# Patient Record
Sex: MALE | Race: WHITE | NOT HISPANIC OR LATINO | URBAN - METROPOLITAN AREA
[De-identification: names, ages, dates, MRNs, and addresses within clinical notes are randomized per-mention and may not be internally consistent; named-entity substitution may affect disease eponyms.]

---

## 2019-06-28 ENCOUNTER — INPATIENT (INPATIENT)
Facility: HOSPITAL | Age: 44
LOS: 2 days | Discharge: HOME | DRG: 175 | End: 2019-07-01
Attending: INTERNAL MEDICINE | Admitting: INTERNAL MEDICINE
Payer: COMMERCIAL

## 2019-06-28 VITALS
RESPIRATION RATE: 18 BRPM | TEMPERATURE: 98 F | HEART RATE: 123 BPM | WEIGHT: 175.05 LBS | DIASTOLIC BLOOD PRESSURE: 76 MMHG | HEIGHT: 71 IN | OXYGEN SATURATION: 100 % | SYSTOLIC BLOOD PRESSURE: 131 MMHG

## 2019-06-28 DIAGNOSIS — I26.99 OTHER PULMONARY EMBOLISM WITHOUT ACUTE COR PULMONALE: ICD-10-CM

## 2019-06-28 DIAGNOSIS — R09.89 OTHER SPECIFIED SYMPTOMS AND SIGNS INVOLVING THE CIRCULATORY AND RESPIRATORY SYSTEMS: ICD-10-CM

## 2019-06-28 DIAGNOSIS — J96.01 ACUTE RESPIRATORY FAILURE WITH HYPOXIA: ICD-10-CM

## 2019-06-28 DIAGNOSIS — R91.8 OTHER NONSPECIFIC ABNORMAL FINDING OF LUNG FIELD: ICD-10-CM

## 2019-06-28 DIAGNOSIS — D72.829 ELEVATED WHITE BLOOD CELL COUNT, UNSPECIFIED: ICD-10-CM

## 2019-06-28 DIAGNOSIS — Z91.89 OTHER SPECIFIED PERSONAL RISK FACTORS, NOT ELSEWHERE CLASSIFIED: ICD-10-CM

## 2019-06-28 DIAGNOSIS — R63.8 OTHER SYMPTOMS AND SIGNS CONCERNING FOOD AND FLUID INTAKE: ICD-10-CM

## 2019-06-28 DIAGNOSIS — Z86.718 PERSONAL HISTORY OF OTHER VENOUS THROMBOSIS AND EMBOLISM: ICD-10-CM

## 2019-06-28 DIAGNOSIS — I10 ESSENTIAL (PRIMARY) HYPERTENSION: ICD-10-CM

## 2019-06-28 LAB
ALBUMIN SERPL ELPH-MCNC: 4.4 G/DL — SIGNIFICANT CHANGE UP (ref 3.3–5)
ALP SERPL-CCNC: 86 U/L — SIGNIFICANT CHANGE UP (ref 40–120)
ALT FLD-CCNC: 42 U/L — SIGNIFICANT CHANGE UP (ref 10–45)
ANION GAP SERPL CALC-SCNC: 14 MMOL/L — SIGNIFICANT CHANGE UP (ref 5–17)
APTT BLD: 30 SEC — SIGNIFICANT CHANGE UP (ref 27.5–36.3)
APTT BLD: 33.7 SEC — SIGNIFICANT CHANGE UP (ref 27.5–36.3)
AST SERPL-CCNC: 30 U/L — SIGNIFICANT CHANGE UP (ref 10–40)
BASOPHILS # BLD AUTO: 0.04 K/UL — SIGNIFICANT CHANGE UP (ref 0–0.2)
BASOPHILS NFR BLD AUTO: 0.3 % — SIGNIFICANT CHANGE UP (ref 0–2)
BILIRUB SERPL-MCNC: 0.4 MG/DL — SIGNIFICANT CHANGE UP (ref 0.2–1.2)
BUN SERPL-MCNC: 17 MG/DL — SIGNIFICANT CHANGE UP (ref 7–23)
CALCIUM SERPL-MCNC: 8.8 MG/DL — SIGNIFICANT CHANGE UP (ref 8.4–10.5)
CHLORIDE SERPL-SCNC: 104 MMOL/L — SIGNIFICANT CHANGE UP (ref 96–108)
CO2 SERPL-SCNC: 23 MMOL/L — SIGNIFICANT CHANGE UP (ref 22–31)
CREAT SERPL-MCNC: 0.99 MG/DL — SIGNIFICANT CHANGE UP (ref 0.5–1.3)
EOSINOPHIL # BLD AUTO: 0.02 K/UL — SIGNIFICANT CHANGE UP (ref 0–0.5)
EOSINOPHIL NFR BLD AUTO: 0.2 % — SIGNIFICANT CHANGE UP (ref 0–6)
GAS PNL BLDV: SIGNIFICANT CHANGE UP
GLUCOSE SERPL-MCNC: 103 MG/DL — HIGH (ref 70–99)
HCT VFR BLD CALC: 43.7 % — SIGNIFICANT CHANGE UP (ref 39–50)
HGB BLD-MCNC: 14.9 G/DL — SIGNIFICANT CHANGE UP (ref 13–17)
HIV 1+2 AB+HIV1 P24 AG SERPL QL IA: SIGNIFICANT CHANGE UP
IMM GRANULOCYTES NFR BLD AUTO: 0.6 % — SIGNIFICANT CHANGE UP (ref 0–1.5)
INR BLD: 1.13 — SIGNIFICANT CHANGE UP (ref 0.88–1.16)
LACTATE SERPL-SCNC: 2 MMOL/L — SIGNIFICANT CHANGE UP (ref 0.5–2)
LIDOCAIN IGE QN: 18 U/L — SIGNIFICANT CHANGE UP (ref 7–60)
LYMPHOCYTES # BLD AUTO: 0.84 K/UL — LOW (ref 1–3.3)
LYMPHOCYTES # BLD AUTO: 6.9 % — LOW (ref 13–44)
MCHC RBC-ENTMCNC: 31.6 PG — SIGNIFICANT CHANGE UP (ref 27–34)
MCHC RBC-ENTMCNC: 34.1 GM/DL — SIGNIFICANT CHANGE UP (ref 32–36)
MCV RBC AUTO: 92.6 FL — SIGNIFICANT CHANGE UP (ref 80–100)
MONOCYTES # BLD AUTO: 1.08 K/UL — HIGH (ref 0–0.9)
MONOCYTES NFR BLD AUTO: 8.9 % — SIGNIFICANT CHANGE UP (ref 2–14)
NEUTROPHILS # BLD AUTO: 10.06 K/UL — HIGH (ref 1.8–7.4)
NEUTROPHILS NFR BLD AUTO: 83.1 % — HIGH (ref 43–77)
NRBC # BLD: 0 /100 WBCS — SIGNIFICANT CHANGE UP (ref 0–0)
NT-PROBNP SERPL-SCNC: 384 PG/ML — HIGH (ref 0–300)
PLATELET # BLD AUTO: 179 K/UL — SIGNIFICANT CHANGE UP (ref 150–400)
POTASSIUM SERPL-MCNC: 3.8 MMOL/L — SIGNIFICANT CHANGE UP (ref 3.5–5.3)
POTASSIUM SERPL-SCNC: 3.8 MMOL/L — SIGNIFICANT CHANGE UP (ref 3.5–5.3)
PROT SERPL-MCNC: 7.4 G/DL — SIGNIFICANT CHANGE UP (ref 6–8.3)
PROTHROM AB SERPL-ACNC: 12.8 SEC — SIGNIFICANT CHANGE UP (ref 10–12.9)
RBC # BLD: 4.72 M/UL — SIGNIFICANT CHANGE UP (ref 4.2–5.8)
RBC # FLD: 12.4 % — SIGNIFICANT CHANGE UP (ref 10.3–14.5)
SODIUM SERPL-SCNC: 141 MMOL/L — SIGNIFICANT CHANGE UP (ref 135–145)
TROPONIN T SERPL-MCNC: 0.12 NG/ML — CRITICAL HIGH (ref 0–0.01)
TROPONIN T SERPL-MCNC: 0.17 NG/ML — CRITICAL HIGH (ref 0–0.01)
WBC # BLD: 12.11 K/UL — HIGH (ref 3.8–10.5)
WBC # FLD AUTO: 12.11 K/UL — HIGH (ref 3.8–10.5)

## 2019-06-28 PROCEDURE — 99223 1ST HOSP IP/OBS HIGH 75: CPT | Mod: GC

## 2019-06-28 PROCEDURE — 71275 CT ANGIOGRAPHY CHEST: CPT | Mod: 26

## 2019-06-28 PROCEDURE — 99291 CRITICAL CARE FIRST HOUR: CPT

## 2019-06-28 PROCEDURE — 99292 CRITICAL CARE ADDL 30 MIN: CPT

## 2019-06-28 PROCEDURE — 71045 X-RAY EXAM CHEST 1 VIEW: CPT | Mod: 26

## 2019-06-28 PROCEDURE — 99222 1ST HOSP IP/OBS MODERATE 55: CPT | Mod: GC

## 2019-06-28 RX ORDER — DIPHENHYDRAMINE HCL 50 MG
50 CAPSULE ORAL ONCE
Refills: 0 | Status: COMPLETED | OUTPATIENT
Start: 2019-06-28 | End: 2019-06-28

## 2019-06-28 RX ORDER — ALBUTEROL 90 UG/1
2.5 AEROSOL, METERED ORAL ONCE
Refills: 0 | Status: COMPLETED | OUTPATIENT
Start: 2019-06-28 | End: 2019-06-28

## 2019-06-28 RX ORDER — FAMOTIDINE 10 MG/ML
20 INJECTION INTRAVENOUS ONCE
Refills: 0 | Status: DISCONTINUED | OUTPATIENT
Start: 2019-06-28 | End: 2019-06-28

## 2019-06-28 RX ORDER — HEPARIN SODIUM 5000 [USP'U]/ML
1400 INJECTION INTRAVENOUS; SUBCUTANEOUS
Qty: 25000 | Refills: 0 | Status: DISCONTINUED | OUTPATIENT
Start: 2019-06-28 | End: 2019-06-28

## 2019-06-28 RX ORDER — ENOXAPARIN SODIUM 100 MG/ML
80 INJECTION SUBCUTANEOUS ONCE
Refills: 0 | Status: COMPLETED | OUTPATIENT
Start: 2019-06-28 | End: 2019-06-28

## 2019-06-28 RX ORDER — SODIUM CHLORIDE 9 MG/ML
1000 INJECTION INTRAMUSCULAR; INTRAVENOUS; SUBCUTANEOUS ONCE
Refills: 0 | Status: COMPLETED | OUTPATIENT
Start: 2019-06-28 | End: 2019-06-28

## 2019-06-28 RX ORDER — HEPARIN SODIUM 5000 [USP'U]/ML
1400 INJECTION INTRAVENOUS; SUBCUTANEOUS
Qty: 25000 | Refills: 0 | Status: DISCONTINUED | OUTPATIENT
Start: 2019-06-28 | End: 2019-06-29

## 2019-06-28 RX ADMIN — ALBUTEROL 2.5 MILLIGRAM(S): 90 AEROSOL, METERED ORAL at 09:30

## 2019-06-28 RX ADMIN — SODIUM CHLORIDE 1000 MILLILITER(S): 9 INJECTION INTRAMUSCULAR; INTRAVENOUS; SUBCUTANEOUS at 11:00

## 2019-06-28 RX ADMIN — ENOXAPARIN SODIUM 80 MILLIGRAM(S): 100 INJECTION SUBCUTANEOUS at 12:36

## 2019-06-28 RX ADMIN — SODIUM CHLORIDE 1000 MILLILITER(S): 9 INJECTION INTRAMUSCULAR; INTRAVENOUS; SUBCUTANEOUS at 09:31

## 2019-06-28 NOTE — H&P ADULT - PROBLEM SELECTOR PLAN 6
1) PCP Contacted on Admission: (Y/N) --> Name & Phone #:  2) Date of Contact with PCP: TBD  3) PCP Contacted at Discharge: TBD  4) Summary of Handoff Given to PCP: TBD   5) Post-Discharge Appointment Date: TBD 1) PCP Contacted on Admission: (Y/N) --> Name & Phone #: Dr. Ramsey Jovel  2) Date of Contact with PCP: TBD  3) PCP Contacted at Discharge: TBD  4) Summary of Handoff Given to PCP: TBD   5) Post-Discharge Appointment Date: TBD

## 2019-06-28 NOTE — ED PROVIDER NOTE - CRITICAL CARE PROVIDED
direct patient care (not related to procedure)/additional history taking/consultation with other physicians/interpretation of diagnostic studies/documentation/consult w/ pt's family directly relating to pts condition

## 2019-06-28 NOTE — H&P ADULT - NSHPLABSRESULTS_GEN_ALL_CORE
.  LABS:                         14.9   12.11 )-----------( 179      ( 28 Jun 2019 09:20 )             43.7     06-28    141  |  104  |  17  ----------------------------<  103<H>  3.8   |  23  |  0.99    Ca    8.8      28 Jun 2019 09:20    TPro  7.4  /  Alb  4.4  /  TBili  0.4  /  DBili  x   /  AST  30  /  ALT  42  /  AlkPhos  86  06-28    PT/INR - ( 28 Jun 2019 09:20 )   PT: 12.8 sec;   INR: 1.13          PTT - ( 28 Jun 2019 09:20 )  PTT:30.0 sec    CARDIAC MARKERS ( 28 Jun 2019 10:49 )  x     / 0.17 ng/mL / x     / x     / x          Serum Pro-Brain Natriuretic Peptide: 384 pg/mL (06-28 @ 10:49)    Lactate, Blood: 2.0 mmoL/L (06-28 @ 09:27)      RADIOLOGY, EKG & ADDITIONAL TESTS: Reviewed.

## 2019-06-28 NOTE — ED ADULT NURSE REASSESSMENT NOTE - NS ED NURSE REASSESS COMMENT FT1
pt updated on plan of care, pending admission, to started heparin at 12am due to Lovenox being given as per pharmacist.

## 2019-06-28 NOTE — CONSULT NOTE ADULT - ASSESSMENT
Pt is a 44yo male with pmhx Neurofibromatosis, HTN and RLE DVT in 2016 presents with SOB and dizziness, found to have submassive PE.

## 2019-06-28 NOTE — H&P ADULT - PROBLEM SELECTOR PLAN 4
Pt with self-reported h/o of hypertension. Currently with SBP in the 130s.  -Currently well-controlled.

## 2019-06-28 NOTE — H&P ADULT - PROBLEM SELECTOR PLAN 2
Pt with h/o of unprovoked DVT in 2016. No fx of PE or DVT  -will obtain b/l doppler studies   -hypercoagulable workup

## 2019-06-28 NOTE — ED ADULT NURSE NOTE - OBJECTIVE STATEMENT
Pt states "I almost passed out this morning, and I have had a cough for a couple of weeks". Pt's O2 sat was 89% on RA, patient placed on 3L via NC.

## 2019-06-28 NOTE — CONSULT NOTE ADULT - PROBLEM SELECTOR RECOMMENDATION 3
- Hx of cough x 2 weeks was productive now dry, may have been viral URI. Presence of wedge shaped ground glass opacities at L base more likely represent pulmonary infarction rather than acute infection. Management of PE as above, monitor for development of infectious symptoms, no antibiotics for now.

## 2019-06-28 NOTE — H&P ADULT - NSHPSOCIALHISTORY_GEN_ALL_CORE
Non-smoker, has approx. 2 glasses of wine 2-3 times/week, no recreational drug use. 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

## 2019-06-28 NOTE — H&P ADULT - PROBLEM SELECTOR PLAN 3
WBC elevated to 12.11 with neutrophil predominance. Most likely reactive leukocytosis given PE and no other indications of infection (afebrile, no clinical symptoms)  -continue to monitor CBC

## 2019-06-28 NOTE — CONSULT NOTE ADULT - PROBLEM SELECTOR RECOMMENDATION 9
- O2 sat in 80's upon presentation, now 90% on room air and improves to 96% with nasal cannula  - Likely due to large pulmonary embolism   - While RV strain present, no acute cor pulmonale noted    Recommendations  - Management of PE as stated below  - Nasal cannula to maintain O2 > 90%  - Bedside spirometry and OOBTC  - Ambulate tomorrow and assess for acute desaturation with exertion

## 2019-06-28 NOTE — H&P ADULT - PROBLEM SELECTOR PLAN 1
Pt presented with dizziness and SOB, segmental/subsegmental PE found on CT angio. Elevated BNP and bedside US showing R heart strain.   -Requiring 3L NC to keep O2 sat above 90%  -hypercoagulable workup  -Tachycardic to 120s in the ED, other VS stable   -s/p 80mg Lovenox, 1 L NS, 1 albuterol neb in ED  -If patient becomes HD unstable, may administer catheter-directed tPA, which is why pt will be transitioned to heparin gtt 12 hours after Lovenox administration   -will obtain formal echocardiogram to better characterize right sided heart function   -b/l doppler to evaluate for DVT  -troponins elevated to 0.17, will trend  -wean of NC as tolerated Pt presented with dizziness and SOB, segmental/subsegmental PE found on CT angio. Elevated BNP and bedside US showing R heart strain.   -Requiring 3L NC to keep O2 sat above 90%  -hypercoagulable workup  -Tachycardic to 120s in the ED, other VS stable   -s/p 80mg Lovenox, 1 L NS, 1 albuterol neb in ED  -If patient becomes HD unstable, may administer catheter-directed tPA, which is why pt will be transitioned to heparin gtt 12 hours after Lovenox administration   -will obtain formal echocardiogram to better characterize right sided heart function   -b/l doppler to evaluate for DVT  -troponins elevated to 0.17, then 0.12. No need to trend further  -wean of NC as tolerated

## 2019-06-28 NOTE — H&P ADULT - HISTORY OF PRESENT ILLNESS
INCOMPLETE NOTE    Pt is a 43yoM with pmhc neurofibromatosis, HTN and RLE DVT (July 2016) who presents to [facility] ED with SOB and dizziness. Pt states that when he returned to his apartment this morning after walking out to  breakfast, he suddenly felt SOB and dizzy. He states that he sat down for 15 minutes in his building's lobby and his symptoms resolved. When he walked to the elevator, symptoms of SOB and dizziness returned and pt states that he passed out without losing consciousness. He also reports that he had associated chest tightness. He denies ever having an episode like this before. Of note, pt states that 2 weeks ago, he developed a productive cough that has persisted and since become dry. He notes that he has had increased stress at work and may sit at his desk for up to 12 hours in a day. He also reports supplement use with chlorophyl and amino acids, which he started 2 weeks ago. Pt states that in July of 2016, he hd an unprovoked RLE DVT that was treated with 30 days of an injectable medication that he believes was Lovenox. Pt denies recent travel or recent surgery. Denies family history of blood clots. On ROS, pt denies f/c, night sweats, n/v, abdominal pain, diarrhea, constipation.    In the ED, vitals were T 97.8, , /76. RR 18, spO2 100%. Labs were remarkable for WBC 12.11 (neutrophil predominance), D-dimer 3580, trop 0.17, . Lactate was 2.0. VBG 7.35/48/23/26/39%. EKG showed sinus tachycardia with . CT PE showed acute diffuse segmental and subsegmental pulmonary emboli b/l and reflux of contrast into the IVC consistent with R heart strain. Bedside US showed R heart strain.     Pt was given 80mg Lovenox (1mg/kg dosing), 1L NS, 1 Albuterol neb and benadryl 50mg IVP. Pt is a 43yoM with pmhc neurofibromatosis, HTN and RLE DVT (July 2016) who presents to [facility] ED with SOB and dizziness. Pt states that when he returned to his apartment this morning after walking out to  breakfast, he suddenly felt SOB and dizzy. He states that he sat down for 15 minutes in his building's lobby and his symptoms resolved. When he walked to the elevator, symptoms of SOB and dizziness returned and pt states that he passed out without losing consciousness. He also reports that he had associated chest tightness. He denies ever having an episode like this before. Of note, pt states that 2 weeks ago, he developed a productive cough that has persisted and since become dry. He notes that he has had increased stress at work and may sit at his desk for up to 12 hours in a day. He also reports supplement use with chlorophyl and amino acids, which he started 2 weeks ago. Pt states that in July of 2016, he hd an unprovoked RLE DVT that was treated with 30 days of an injectable medication that he believes was Lovenox. Pt denies recent travel or recent surgery. Denies family history of blood clots. On ROS, pt denies f/c, night sweats, n/v, abdominal pain, diarrhea, constipation.    In the ED, vitals were T 97.8, , /76. RR 18, spO2 100%. Labs were remarkable for WBC 12.11 (neutrophil predominance), D-dimer 3580, trop 0.17, . Lactate was 2.0. VBG 7.35/48/23/26/39%. EKG showed sinus tachycardia with . CT PE showed acute diffuse segmental and subsegmental pulmonary emboli b/l and reflux of contrast into the IVC consistent with R heart strain. Bedside US showed R heart strain.     Pt was given 80mg Lovenox (1mg/kg dosing), 1L NS, 1 Albuterol neb and benadryl 50mg IVP.

## 2019-06-28 NOTE — CONSULT NOTE ADULT - SUBJECTIVE AND OBJECTIVE BOX
MICU CONSULT NOTE ----INCOMPLETE NOTE PENDING DISCUSSION WITH ATTENDING    CHIEF COMPLAINT: Patient is a 43y old  Male who presents with a chief complaint of SOB and dizziness.    HPI: Pt is a 43yoM with pmhc neurofibromatosis, HTN and RLE DVT (July 2016) who presents to [facility] ED with SOB and dizziness. Pt states that when he returned to his apartment this morning after walking out to  breakfast, he suddenly felt SOB and dizzy. He states thathe sat down for 15 minutes in his building's lobby and his symptoms resolved. When he walked to the elevator, symptoms of SOB and dizziness returned and pt states that he passed out without losing consciousness He also reports that he had associated chest tightness. He denies ever having an episode like this before. Of note, pt states that 2 weeks ago, he developed a productive cough that has persisted and since become dry. He notes that he has had increased stress at work and may sit at his desk for up to 12 hours in a day. He also reports supplement use with chlorophyl and amino acids, which he started 2 weeks ago. Pt states that in July of 2016, he hd an unprovoked RLE DVT that was treated with 30 days of an injectable medication that he believes was Lovenox. Pt denies recent travel or recent surgery. Denies family history of blood clots. On ROS, pt denies f/c, night sweats, n/v, abdominal pain, diarrhea, constipation.    In the ED, vitals were T 97.8, , /76. RR 18, spO2 100%. Labs were remarkable for WBC 12.11 (neutrophil predominance), D-dimer 3580, trop 0.17, . Lactate was 2.0. VBG 7.35/48/23/26/39%. EKG showed sinus tachycardia with . CT PE showed acute diffuse segmental and subsegmental pulmonary emboli b/l and reflux of contrast into the IVC consistent with R heart strain. Bedside US showed R heart strain.     Pt was given 80mg Lovenox (1mg/kg dosing), 1L NS, 1 Albuterol neb and benadryl 50mg IVP.    PMHx: Neurofibromatosis, HTN  PSHx: Tumor resection  FHx: none  SocialHx: Never smoker, drinks 1 glass wine/night, denies illicit drug use, works a desk job at [employer]   Allergies: NKDA    REVIEW OF SYSTEMS: negative except as stated in HPI.    MEDICATIONS  (STANDING):  enoxaparin Injectable 80 milliGRAM(s) SubCutaneous Once    Vital Signs Last 24 Hrs  T(C): 36.6 (28 Jun 2019 08:52), Max: 36.6 (28 Jun 2019 08:52)  T(F): 97.8 (28 Jun 2019 08:52), Max: 97.8 (28 Jun 2019 08:52)  HR: 123 (28 Jun 2019 08:52) (123 - 123)  BP: 131/76 (28 Jun 2019 08:52) (131/76 - 131/76)  BP(mean): --  RR: 18 (28 Jun 2019 08:52) (18 - 18)  SpO2: 100% (28 Jun 2019 08:52) (100% - 100%)    PHYSICAL EXAM:  GEN: alert, NAD, comfortable in bed, diaphoretic, calm  HEENT: PERRL, no relative afferent pupillary defect, EOMI, moist MM, no pharyngeal erythema or exudate  CV: tachycardic, S1/S2, no murmurs appreciated, JVD noted, no carotid bruits, delayed capillary refill  RESP: L basilar fine crackles, R anterior wheezes, good respiratory effort, good air movement, speaking in full sentences, on 3L NC  ABD: soft, BS+, nontender, nondistended, no guarding/rebound  EXTREMITIES: WWP, pulses 2+ in all 4 extremities, no peripheral edema, b/l toes cool to palpation  MSK: full range of motion of all 4 extremities  SKIN: warm, dry, intact, no rashes, multiple soft nodules diffusely distributed  NEURO: A&Ox3, no focal deficits, strength 5/5 throughout, no sensory deficits  PSYC: normal mood/affect    LABS: reviewed.                    14.9   12.11 )-----------( 179      ( 28 Jun 2019 09:20 )             43.7     06-28  141  |  104  |  17  ----------------------------<  103<H>  3.8   |  23  |  0.99    Ca    8.8      28 Jun 2019 09:20  TPro  7.4  /  Alb  4.4  /  TBili  0.4  /  DBili  x   /  AST  30  /  ALT  42  /  AlkPhos  86  06-28  PT/INR - ( 28 Jun 2019 09:20 )   PT: 12.8 sec;   INR: 1.13       PTT - ( 28 Jun 2019 09:20 )  PTT:30.0 sec  LIVER FUNCTIONS - ( 28 Jun 2019 09:20 )  Alb: 4.4 g/dL / Pro: 7.4 g/dL / ALK PHOS: 86 U/L / ALT: 42 U/L / AST: 30 U/L / GGT: x           CARDIAC MARKERS ( 28 Jun 2019 10:49 )  x     / 0.17 ng/mL / x     / x     / x        RADIOLOGY AND ADDITIONAL TESTS: reviewed.    Chest XR:  EKG: sinus tachycardia    < from: CT Angio Chest PE Protocol w/ IV Cont (06.28.19 @ 10:36) >  IMPRESSION:   Acute diffuse segmental and subsegmental pulmonary bilaterally.  Reflux of contrast into the IVC consistent with right heart strain. MICU CONSULT NOTE    CHIEF COMPLAINT: Patient is a 43y old  Male who presents with a chief complaint of SOB and dizziness.    HPI: Pt is a 43yoM with pmhc neurofibromatosis, HTN and RLE DVT (July 2016) who presents to [facility] ED with SOB and dizziness. Pt states that when he returned to his apartment this morning after walking out to  breakfast, he suddenly felt SOB and dizzy. He states thathe sat down for 15 minutes in his building's lobby and his symptoms resolved. When he walked to the elevator, symptoms of SOB and dizziness returned and pt states that he passed out without losing consciousness He also reports that he had associated chest tightness. He denies ever having an episode like this before. Of note, pt states that 2 weeks ago, he developed a productive cough that has persisted and since become dry. He notes that he has had increased stress at work and may sit at his desk for up to 12 hours in a day. He also reports supplement use with chlorophyl and amino acids, which he started 2 weeks ago. Pt states that in July of 2016, he hd an unprovoked RLE DVT that was treated with 30 days of an injectable medication that he believes was Lovenox. Pt denies recent travel or recent surgery. Denies family history of blood clots. On ROS, pt denies f/c, night sweats, n/v, abdominal pain, diarrhea, constipation.    In the ED, vitals were T 97.8, , /76. RR 18, spO2 100%. Labs were remarkable for WBC 12.11 (neutrophil predominance), D-dimer 3580, trop 0.17, . Lactate was 2.0. VBG 7.35/48/23/26/39%. EKG showed sinus tachycardia with . CT PE showed acute diffuse segmental and subsegmental pulmonary emboli b/l and reflux of contrast into the IVC consistent with R heart strain. Bedside US showed R heart strain.     Pt was given 80mg Lovenox (1mg/kg dosing), 1L NS, 1 Albuterol neb and benadryl 50mg IVP.    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    MEDICATIONS  (STANDING):  enoxaparin Injectable 80 milliGRAM(s) SubCutaneous Once    Vital Signs Last 24 Hrs  T(C): 36.6 (28 Jun 2019 08:52), Max: 36.6 (28 Jun 2019 08:52)  T(F): 97.8 (28 Jun 2019 08:52), Max: 97.8 (28 Jun 2019 08:52)  HR: 123 (28 Jun 2019 08:52) (123 - 123)  BP: 131/76 (28 Jun 2019 08:52) (131/76 - 131/76)  BP(mean): --  RR: 18 (28 Jun 2019 08:52) (18 - 18)  SpO2: 100% (28 Jun 2019 08:52) (100% - 100%)    PHYSICAL EXAM:  GEN: alert, NAD, comfortable in bed, diaphoretic, calm  HEENT: PERRL, no relative afferent pupillary defect, EOMI, moist MM, no pharyngeal erythema or exudate  CV: tachycardic, S1/S2, no murmurs appreciated, JVD noted, no carotid bruits, delayed capillary refill  RESP: L basilar fine crackles, R anterior wheezes, good respiratory effort, good air movement, speaking in full sentences, on 3L NC  ABD: soft, BS+, nontender, nondistended, no guarding/rebound  EXTREMITIES: WWP, pulses 2+ in all 4 extremities, no peripheral edema, b/l toes cool to palpation  MSK: full range of motion of all 4 extremities  SKIN: warm, dry, intact, no rashes, multiple soft nodules diffusely distributed  NEURO: A&Ox3, no focal deficits, strength 5/5 throughout, no sensory deficits  PSYC: normal mood/affect    LABS: reviewed.                    14.9   12.11 )-----------( 179      ( 28 Jun 2019 09:20 )             43.7     06-28  141  |  104  |  17  ----------------------------<  103<H>  3.8   |  23  |  0.99    Ca    8.8      28 Jun 2019 09:20  TPro  7.4  /  Alb  4.4  /  TBili  0.4  /  DBili  x   /  AST  30  /  ALT  42  /  AlkPhos  86  06-28  PT/INR - ( 28 Jun 2019 09:20 )   PT: 12.8 sec;   INR: 1.13       PTT - ( 28 Jun 2019 09:20 )  PTT:30.0 sec  LIVER FUNCTIONS - ( 28 Jun 2019 09:20 )  Alb: 4.4 g/dL / Pro: 7.4 g/dL / ALK PHOS: 86 U/L / ALT: 42 U/L / AST: 30 U/L / GGT: x           CARDIAC MARKERS ( 28 Jun 2019 10:49 )  x     / 0.17 ng/mL / x     / x     / x        RADIOLOGY AND ADDITIONAL TESTS: reviewed.  EKG: sinus tachycardia  < from: CT Angio Chest PE Protocol w/ IV Cont (06.28.19 @ 10:36) >  IMPRESSION:   Acute diffuse segmental and subsegmental pulmonary bilaterally.  Reflux of contrast into the IVC consistent with right heart strain.

## 2019-06-28 NOTE — ED PROVIDER NOTE - OBJECTIVE STATEMENT
aefsedfs 42 yo M, presents c/o near syncopal episode this morning. Patient reports he was feeling fine, until this morning when he went for a walk and became suddenly lightheaded and dizzy. He felt as if he was going to faint but didn't. Denied chest pain or shortness of breath. Denied nausea, vomiting, recent illness, diarrhea, headache or confusion. Denied any recent period of immobilization, recent surgery, hospitilization, long plan ride car ride or train.

## 2019-06-28 NOTE — H&P ADULT - ASSESSMENT
Pt is 42yo male with h/o neurofibromatosis, HTN, and RLE DVT in 2016 presenting with SOB and dizziness, found to have submassive PE.

## 2019-06-28 NOTE — CONSULT NOTE ADULT - SUBJECTIVE AND OBJECTIVE BOX
Patient is a 43y old  Male who presents with a chief complaint of     HPI: 42 yo M with PMH neurofibromatosis, DVT of RLE in 2016 s/p 1 month of lovenox presents with acute onset chest heaviness, dizziness and presyncopal episodes x 2 this morning. States he was in his usual state of health until this morning he went to leave his apartment building and felt severe dizziness and that he was going to pass out, slid to the floor but did not fully lose consciousness. He noted chest tightness and trouble breathing, it improved after he sat for some time, attempted to get up to leave again and had the same symptoms, came to ED. Was diagnosed with DVT previously, had no known cause for his previous DVT. Currently denies recent immobilization, travel, hospitalizations. Has a desk job in finance, but still ambulates throughout the day. Denies steroid use or testosterone use, has some OTC herbal supplements with amino acids otherwise no medication. No fam hx of DVT/PE, no fam hx of malignancy, no personal hx of malignancy. Denies weight loss, fevers/chills, night sweats. Has noticed a cough for the last 2 weeks, was productive, now dry. No blood in the urine or stool, no abdominal pain, no nausea/vomiting.    ROS: 12 pt ROS otherwise negative      PAST MEDICAL & SURGICAL HISTORY:  Neurofibromatosis      FAMILY HISTORY:      SOCIAL HISTORY:  Smoking Status: [ ] Current, [ ] Former, [x ] Never  Pack Years:    MEDICATIONS:  Pulmonary:    Antimicrobials:    Anticoagulants:  heparin  Infusion 1400 Unit(s)/Hr IV Continuous <Continuous>    Onc:    GI/:    Endocrine:    Cardiac:    Other Medications:      Allergies    No Known Allergies    Intolerances        Vital Signs Last 24 Hrs  T(C): 36.6 (28 Jun 2019 14:20), Max: 36.6 (28 Jun 2019 08:52)  T(F): 97.9 (28 Jun 2019 14:20), Max: 97.9 (28 Jun 2019 14:20)  HR: 103 (28 Jun 2019 14:20) (103 - 123)  BP: 155/77 (28 Jun 2019 14:20) (131/76 - 155/77)  BP(mean): --  RR: 16 (28 Jun 2019 14:20) (16 - 18)  SpO2: 96% (28 Jun 2019 14:20) (96% - 100%)    General: NAD, AAO x3, mildly anxious appearing  HEENT: No icterus,. Moist mucous membranes  Neck: No JVD noted. Supple, no meningismus  Cardio: S1, S2 noted, tachycardic, no M/R/G  Resp: Trace rales at left base otherwise CTA B/L  Abdo: Soft, NT, bowel sounds present. No organomegaly  Extremities: + asymmetrical swelling of RLE with varicosities noted  Neuro: AAO x3, grossly normal motor strength.  Lymphnodes: no lymphadenopathy identified.  Skin: Dry, no rashes, numerous fibromas noted     LABS:      CBC Full  -  ( 28 Jun 2019 09:20 )  WBC Count : 12.11 K/uL  RBC Count : 4.72 M/uL  Hemoglobin : 14.9 g/dL  Hematocrit : 43.7 %  Platelet Count - Automated : 179 K/uL  Mean Cell Volume : 92.6 fl  Mean Cell Hemoglobin : 31.6 pg  Mean Cell Hemoglobin Concentration : 34.1 gm/dL  Auto Neutrophil # : 10.06 K/uL  Auto Lymphocyte # : 0.84 K/uL  Auto Monocyte # : 1.08 K/uL  Auto Eosinophil # : 0.02 K/uL  Auto Basophil # : 0.04 K/uL  Auto Neutrophil % : 83.1 %  Auto Lymphocyte % : 6.9 %  Auto Monocyte % : 8.9 %  Auto Eosinophil % : 0.2 %  Auto Basophil % : 0.3 %    06-28    141  |  104  |  17  ----------------------------<  103<H>  3.8   |  23  |  0.99    Ca    8.8      28 Jun 2019 09:20    TPro  7.4  /  Alb  4.4  /  TBili  0.4  /  DBili  x   /  AST  30  /  ALT  42  /  AlkPhos  86  06-28    PT/INR - ( 28 Jun 2019 09:20 )   PT: 12.8 sec;   INR: 1.13          PTT - ( 28 Jun 2019 09:20 )  PTT:30.0 sec        CARDIAC MARKERS ( 28 Jun 2019 10:49 )  x     / 0.17 ng/mL / x     / x     / x        Serum Pro-Brain Natriuretic Peptide (06.28.19 @ 10:49)    Serum Pro-Brain Natriuretic Peptide: 384:     EKG - Sinus tach, incomplete RBBB, REINALDO, PRWP    Bedside echo  RV dilation, normal TAPSE, mild flattening of IVS in diastole, LVH with normal LVEF, small pericardial effusion            RADIOLOGY & ADDITIONAL STUDIES (The following images were personally reviewed):  < from: CT Angio Chest PE Protocol w/ IV Cont (06.28.19 @ 10:36) >  FINDINGS:  There are extensive segmental and subsegmental bilateral pulmonary emboli   seen in the right  (upper, middle, and lower) and left (upper and lower)   lobes.    RV: LV ration is normal. Reflux of contrast into IVC indicative of acute   right heart failure/ right heart strain.     Mild aneurysmal dilation of the aortic root measuring 4.1 cm. Mild   dilation of the main pulmonary artery measuring 3.3 cm    The heart is normal in size. No pericardial effusion is seen. No   mediastinal, hilar or axillary lymphadenopathy is seen.    No pleural effusions are seen. There two small wedge shaped areas of   wedge shaped consolidation in the peripheral portion of the left  left   lower lobe consistent with acute pulmonary emboli.      Limited evaluation of the upper abdomen demonstrates reflux of contrast   into the IVC consistent with heart failure.     Evaluation of the osseous structures demonstrates mild degenerative   changes.      IMPRESSION:     Acute diffuse segmental and subsegmental pulmonary bilaterally.    Reflux of contrast into the IVC consistent with right heart strain.     < end of copied text > Patient is a 43y old  Male who presents with a chief complaint of chest heaviness    HPI: 44 yo M with PMH neurofibromatosis, DVT of RLE in 2016 s/p 1 month of lovenox presents with acute onset chest heaviness, dizziness and presyncopal episodes x 2 this morning. States he was in his usual state of health until this morning he went to leave his apartment building and felt severe dizziness and that he was going to pass out, slid to the floor but did not fully lose consciousness. He noted chest tightness and trouble breathing, it improved after he sat for some time, attempted to get up to leave again and had the same symptoms, came to ED. Was diagnosed with DVT previously, had no known cause for his previous DVT. Currently denies recent immobilization, travel, hospitalizations. Has a desk job in finance, but still ambulates throughout the day. Denies steroid use or testosterone use, has some OTC herbal supplements with amino acids otherwise no medication. No fam hx of DVT/PE, no fam hx of malignancy, no personal hx of malignancy. Denies weight loss, fevers/chills, night sweats. Has noticed a cough for the last 2 weeks, was productive, now dry. No blood in the urine or stool, no abdominal pain, no nausea/vomiting.    ROS: 12 pt ROS otherwise negative      PAST MEDICAL & SURGICAL HISTORY:  Neurofibromatosis      FAMILY HISTORY:      SOCIAL HISTORY:  Smoking Status: [ ] Current, [ ] Former, [x ] Never  Pack Years:    MEDICATIONS:  Pulmonary:    Antimicrobials:    Anticoagulants:  heparin  Infusion 1400 Unit(s)/Hr IV Continuous <Continuous>    Onc:    GI/:    Endocrine:    Cardiac:    Other Medications:      Allergies    No Known Allergies    Intolerances        Vital Signs Last 24 Hrs  T(C): 36.6 (28 Jun 2019 14:20), Max: 36.6 (28 Jun 2019 08:52)  T(F): 97.9 (28 Jun 2019 14:20), Max: 97.9 (28 Jun 2019 14:20)  HR: 103 (28 Jun 2019 14:20) (103 - 123)  BP: 155/77 (28 Jun 2019 14:20) (131/76 - 155/77)  BP(mean): --  RR: 16 (28 Jun 2019 14:20) (16 - 18)  SpO2: 96% (28 Jun 2019 14:20) (96% - 100%)    General: NAD, AAO x3, mildly anxious appearing  HEENT: No icterus,. Moist mucous membranes  Neck: No JVD noted. Supple, no meningismus  Cardio: S1, S2 noted, tachycardic, no M/R/G  Resp: Trace rales at left base otherwise CTA B/L  Abdo: Soft, NT, bowel sounds present. No organomegaly  Extremities: + asymmetrical swelling of RLE with varicosities noted  Neuro: AAO x3, grossly normal motor strength.  Lymphnodes: no lymphadenopathy identified.  Skin: Dry, no rashes, numerous fibromas noted     LABS:      CBC Full  -  ( 28 Jun 2019 09:20 )  WBC Count : 12.11 K/uL  RBC Count : 4.72 M/uL  Hemoglobin : 14.9 g/dL  Hematocrit : 43.7 %  Platelet Count - Automated : 179 K/uL  Mean Cell Volume : 92.6 fl  Mean Cell Hemoglobin : 31.6 pg  Mean Cell Hemoglobin Concentration : 34.1 gm/dL  Auto Neutrophil # : 10.06 K/uL  Auto Lymphocyte # : 0.84 K/uL  Auto Monocyte # : 1.08 K/uL  Auto Eosinophil # : 0.02 K/uL  Auto Basophil # : 0.04 K/uL  Auto Neutrophil % : 83.1 %  Auto Lymphocyte % : 6.9 %  Auto Monocyte % : 8.9 %  Auto Eosinophil % : 0.2 %  Auto Basophil % : 0.3 %    06-28    141  |  104  |  17  ----------------------------<  103<H>  3.8   |  23  |  0.99    Ca    8.8      28 Jun 2019 09:20    TPro  7.4  /  Alb  4.4  /  TBili  0.4  /  DBili  x   /  AST  30  /  ALT  42  /  AlkPhos  86  06-28    PT/INR - ( 28 Jun 2019 09:20 )   PT: 12.8 sec;   INR: 1.13          PTT - ( 28 Jun 2019 09:20 )  PTT:30.0 sec        CARDIAC MARKERS ( 28 Jun 2019 10:49 )  x     / 0.17 ng/mL / x     / x     / x        Serum Pro-Brain Natriuretic Peptide (06.28.19 @ 10:49)    Serum Pro-Brain Natriuretic Peptide: 384:     EKG - Sinus tach, incomplete RBBB, REINALDO, PRWP    Bedside echo  RV dilation, normal TAPSE, mild flattening of IVS in diastole, LVH with normal LVEF, small pericardial effusion            RADIOLOGY & ADDITIONAL STUDIES (The following images were personally reviewed):  < from: CT Angio Chest PE Protocol w/ IV Cont (06.28.19 @ 10:36) >  FINDINGS:  There are extensive segmental and subsegmental bilateral pulmonary emboli   seen in the right  (upper, middle, and lower) and left (upper and lower)   lobes.    RV: LV ration is normal. Reflux of contrast into IVC indicative of acute   right heart failure/ right heart strain.     Mild aneurysmal dilation of the aortic root measuring 4.1 cm. Mild   dilation of the main pulmonary artery measuring 3.3 cm    The heart is normal in size. No pericardial effusion is seen. No   mediastinal, hilar or axillary lymphadenopathy is seen.    No pleural effusions are seen. There two small wedge shaped areas of   wedge shaped consolidation in the peripheral portion of the left  left   lower lobe consistent with acute pulmonary emboli.      Limited evaluation of the upper abdomen demonstrates reflux of contrast   into the IVC consistent with heart failure.     Evaluation of the osseous structures demonstrates mild degenerative   changes.      IMPRESSION:     Acute diffuse segmental and subsegmental pulmonary bilaterally.    Reflux of contrast into the IVC consistent with right heart strain.     < end of copied text >

## 2019-06-28 NOTE — H&P ADULT - NSHPPHYSICALEXAM_GEN_ALL_CORE
.  VITAL SIGNS:  T(C): 36.6 (06-28-19 @ 14:20), Max: 36.6 (06-28-19 @ 08:52)  T(F): 97.9 (06-28-19 @ 14:20), Max: 97.9 (06-28-19 @ 14:20)  HR: 103 (06-28-19 @ 14:20) (103 - 123)  BP: 155/77 (06-28-19 @ 14:20) (131/76 - 155/77)  BP(mean): --  RR: 16 (06-28-19 @ 14:20) (16 - 18)  SpO2: 96% (06-28-19 @ 14:20) (96% - 100%)  Wt(kg): --    PHYSICAL EXAM:    Constitutional: WDWN resting comfortably in bed; NAD  Head: NC/AT  Eyes: PERRL, EOMI, anicteric sclera  ENT: no nasal discharge; uvula midline, no oropharyngeal erythema or exudates; MMM  Neck: supple; no JVD or thyromegaly  Respiratory: CTA B/L; no W/R/R, no retractions  Cardiac: +S1/S2; RRR; no M/R/G; PMI non-displaced  Gastrointestinal: abdomen soft, NT/ND; no rebound or guarding; +BSx4  Back: spine midline, no bony tenderness or step-offs; no CVAT B/L  Extremities: WWP, no clubbing or cyanosis; no peripheral edema  Musculoskeletal: NROM x4; no joint swelling, tenderness or erythema  Vascular: 2+ radial, femoral, DP/PT pulses B/L  Dermatologic: skin warm, dry and intact; no rashes, wounds, or scars  Lymphatic: no submandibular or cervical LAD  Neurologic: AAOx3; CNII-XII grossly intact; no focal deficits  Psychiatric: affect and characteristics of appearance, verbalizations, behaviors are appropriate .  VITAL SIGNS:  T(C): 36.6 (06-28-19 @ 14:20), Max: 36.6 (06-28-19 @ 08:52)  T(F): 97.9 (06-28-19 @ 14:20), Max: 97.9 (06-28-19 @ 14:20)  HR: 103 (06-28-19 @ 14:20) (103 - 123)  BP: 155/77 (06-28-19 @ 14:20) (131/76 - 155/77)  BP(mean): --  RR: 16 (06-28-19 @ 14:20) (16 - 18)  SpO2: 96% (06-28-19 @ 14:20) (96% - 100%)  Wt(kg): --    PHYSICAL EXAM:    Constitutional: WDWN resting comfortably in bed; NAD  Head: NC/AT  Eyes: PERRL, EOMI, anicteric sclera  ENT: no nasal discharge; uvula midline, no oropharyngeal erythema or exudates; MMM  Neck: supple; no JVD or thyromegaly, no LAD  Respiratory: CTA B/L; no W/R/R, no retractions  Cardiac: +S1/S2; Tachycardic, regular rhythm; no M/R/G; PMI non-displaced  Gastrointestinal: abdomen soft, NT/ND; no rebound or guarding; +BSx4  Extremities: WWP, no clubbing or cyanosis; no peripheral edema. Negative Bang sign.   Musculoskeletal: NROM x4; no joint swelling, tenderness or erythema  Vascular: 2+ radial, femoral, DP/PT pulses B/L  Dermatologic: skin warm, dry and intact; no rashes, wounds, or scars. Multiple fleshy nodules primarily present over extremities  Lymphatic: no submandibular or cervical LAD  Neurologic: AAOx3; CNII-XII grossly intact; no focal deficits  Psychiatric: affect and characteristics of appearance, verbalizations, behaviors are appropriate

## 2019-06-28 NOTE — CONSULT NOTE ADULT - PROBLEM SELECTOR RECOMMENDATION 9
Submassive. Pt presented with SOB and dizziness. Found to have segmental/subsegmental PEs on CT PE. Bedside US revealed R heart strain. Tachycardic but BP stable. Appears to be unprovoked. May have occurred in the setting of neurofibromatosis, given pt has hx of unprovoked RLE DVT in 2016. S/p Lovenox 80mg in the ED.  - Start Heparin gtt 12 hours after Lovenox administration in case catheter-directed tPA is indicated (pt becomes HD unstable, remains persistently tachycardic)  - currently on 3L NC, wean O2 as tolerated  - trend trop  - ECHO  - B/l LE dopplers  - hypercoagulable workup  - admit to tele Submassive. Pt presented with SOB and dizziness. Found to have segmental/subsegmental PEs on CT PE. Bedside US revealed R heart strain. Tachycardic but BP stable. Appears to be unprovoked. May have occurred in the setting of neurofibromatosis, given pt has hx of unprovoked RLE DVT in 2016. S/p Lovenox 80mg in the ED.  - Start Heparin gtt 12 hours after Lovenox administration in case catheter-directed tPA is indicated (pt becomes HD unstable, remains persistently tachycardic)  - currently on 3L NC, wean O2 as tolerated  - trend trop  - ECHO  - B/l LE dopplers  - hypercoagulable workup  - case discussed with fellow and attending, admit to tele

## 2019-06-28 NOTE — ED PROVIDER NOTE - CLINICAL SUMMARY MEDICAL DECISION MAKING FREE TEXT BOX
asdfasdf Patient with near syncopal episode today, found to have multiple subsegemental PE and segmental Pulmonary embolisms. Patient with positive troponin and BNP, patient with oxygen demand, tachycardia. Patient evaluated by PERT team. Given lovenox in ED. patietn to be admitted to Stepdown/telemetry.

## 2019-06-28 NOTE — CONSULT NOTE ADULT - ASSESSMENT
44 yo M with previous hx of DVT, neurofibromatosis presents with acute onset presyncope associated with tachycardia, tachypnea, hypoxia, found to have mild elevation of BNP and troponin with B/L PE and RV dilation, mild RV strain on echo consistent with submassive PE

## 2019-06-28 NOTE — H&P ADULT - NSHPREVIEWOFSYSTEMS_GEN_ALL_CORE
REVIEW OF SYSTEMS:    CONSTITUTIONAL: No weakness, fevers or chills  EYES/ENT: No visual changes, no throat pain.   NECK: No pain or stiffness  RESPIRATORY: No wheezing or hemoptysis. No SOB currently  CARDIOVASCULAR: No chest pain or palpitations  GASTROINTESTINAL: No abdominal or epigastric pain. No nausea, vomiting, or hematemesis; No diarrhea or constipation. No melena or hematochezia.  GENITOURINARY: No dysuria, frequency or hematuria  NEUROLOGICAL: No numbness or weakness  SKIN: No itching, rashes.

## 2019-06-28 NOTE — ED ADULT TRIAGE NOTE - CHIEF COMPLAINT QUOTE
pt reports had syncopal episodes today prior to arrival. c/o non-productive cough x 3 weeks but otherwise feeling well. Upon EMS arrival, pt O2 sat 90% on RA up to 99% on 4L. Denies CP, SOB, rectal bleeding. A and O x 3.

## 2019-06-28 NOTE — H&P ADULT - PROBLEM SELECTOR PLAN 5
F: None  E: Replete for K<4 and Mag <2  N: DASH/TLC Diet  A: Lovenox, but will transition to heparin gtt  Dispo: [facility]  Code: FULL CODE F: None  E: Replete for K<4 and Mag <2  N: NPO given possible need for TPA  A: Lovenox, but will transition to heparin gtt  Dispo: [facility]  Code: FULL CODE

## 2019-06-29 LAB
ANION GAP SERPL CALC-SCNC: 14 MMOL/L — SIGNIFICANT CHANGE UP (ref 5–17)
APTT BLD: 45.5 SEC — HIGH (ref 27.5–36.3)
APTT BLD: 53 SEC — HIGH (ref 27.5–36.3)
APTT BLD: 53.3 SEC — HIGH (ref 27.5–36.3)
BUN SERPL-MCNC: 13 MG/DL — SIGNIFICANT CHANGE UP (ref 7–23)
CALCIUM SERPL-MCNC: 9 MG/DL — SIGNIFICANT CHANGE UP (ref 8.4–10.5)
CHLORIDE SERPL-SCNC: 107 MMOL/L — SIGNIFICANT CHANGE UP (ref 96–108)
CO2 SERPL-SCNC: 22 MMOL/L — SIGNIFICANT CHANGE UP (ref 22–31)
CREAT SERPL-MCNC: 0.93 MG/DL — SIGNIFICANT CHANGE UP (ref 0.5–1.3)
GLUCOSE SERPL-MCNC: 97 MG/DL — SIGNIFICANT CHANGE UP (ref 70–99)
HCT VFR BLD CALC: 43.6 % — SIGNIFICANT CHANGE UP (ref 39–50)
HCYS SERPL-MCNC: 7.5 UMOL/L — SIGNIFICANT CHANGE UP
HGB BLD-MCNC: 14.5 G/DL — SIGNIFICANT CHANGE UP (ref 13–17)
MAGNESIUM SERPL-MCNC: 2 MG/DL — SIGNIFICANT CHANGE UP (ref 1.6–2.6)
MCHC RBC-ENTMCNC: 30.9 PG — SIGNIFICANT CHANGE UP (ref 27–34)
MCHC RBC-ENTMCNC: 33.3 GM/DL — SIGNIFICANT CHANGE UP (ref 32–36)
MCV RBC AUTO: 93 FL — SIGNIFICANT CHANGE UP (ref 80–100)
NRBC # BLD: 0 /100 WBCS — SIGNIFICANT CHANGE UP (ref 0–0)
PLATELET # BLD AUTO: 182 K/UL — SIGNIFICANT CHANGE UP (ref 150–400)
POTASSIUM SERPL-MCNC: 4 MMOL/L — SIGNIFICANT CHANGE UP (ref 3.5–5.3)
POTASSIUM SERPL-SCNC: 4 MMOL/L — SIGNIFICANT CHANGE UP (ref 3.5–5.3)
RBC # BLD: 4.69 M/UL — SIGNIFICANT CHANGE UP (ref 4.2–5.8)
RBC # FLD: 12.7 % — SIGNIFICANT CHANGE UP (ref 10.3–14.5)
SODIUM SERPL-SCNC: 143 MMOL/L — SIGNIFICANT CHANGE UP (ref 135–145)
WBC # BLD: 8.87 K/UL — SIGNIFICANT CHANGE UP (ref 3.8–10.5)
WBC # FLD AUTO: 8.87 K/UL — SIGNIFICANT CHANGE UP (ref 3.8–10.5)

## 2019-06-29 PROCEDURE — 99233 SBSQ HOSP IP/OBS HIGH 50: CPT | Mod: GC

## 2019-06-29 RX ORDER — HEPARIN SODIUM 5000 [USP'U]/ML
2000 INJECTION INTRAVENOUS; SUBCUTANEOUS
Qty: 25000 | Refills: 0 | Status: DISCONTINUED | OUTPATIENT
Start: 2019-06-29 | End: 2019-07-01

## 2019-06-29 RX ADMIN — HEPARIN SODIUM 16 UNIT(S)/HR: 5000 INJECTION INTRAVENOUS; SUBCUTANEOUS at 13:14

## 2019-06-29 RX ADMIN — HEPARIN SODIUM 14 UNIT(S)/HR: 5000 INJECTION INTRAVENOUS; SUBCUTANEOUS at 00:00

## 2019-06-29 NOTE — PROGRESS NOTE ADULT - PROBLEM SELECTOR PLAN 5
F: None  E: Replete for K<4 and Mag <2  N: NPO given possible need for TPA  A: Lovenox, but will transition to heparin gtt  Dispo: [facility]  Code: FULL CODE F: None  E: Replete for K<4 and Mag <2  N: NPO given possible need for TPA  A: heparin gtt  Dispo: 7[facility]  Code: FULL CODE

## 2019-06-29 NOTE — PROGRESS NOTE ADULT - PROBLEM SELECTOR PLAN 1
Pt presented with dizziness and SOB, segmental/subsegmental PE found on CT angio. Elevated BNP and bedside US showing R heart strain.   -sat above 90% on room air, but prefers to remain on 3L NC for now and will wean as tolerated  -hypercoagulable workup ordered in setting of unprovoked PE, will f/u results  -tachycardic to 120s in the ED (104 max overnight and downtrending), other VS remain stable   -NPO for possible administration of catheter-directed tPA; pt transitioned at midnight to heparin gtt (12 hours after Lovenox administration)  -will obtain formal echocardiogram to better characterize right sided heart function   -consider b/l doppler to evaluate for DVT  -troponins downtrended from 0.17 to 0.12 6/28; no need to trend further  -will walk patient today

## 2019-06-29 NOTE — PROGRESS NOTE ADULT - PROBLEM SELECTOR PLAN 4
Pt with self-reported h/o of hypertension. Currently with SBP in the 130s.  -Currently well-controlled. Pt with self-reported h/o of hypertension. Currently with SBP in the 130s.  -currently well-controlled.  -patient does not use any home medications

## 2019-06-29 NOTE — PROGRESS NOTE ADULT - PROBLEM SELECTOR PLAN 2
Pt with h/o of unprovoked DVT in 2016. No fx of PE or DVT  -will consider obtaining b/l doppler studies  -will f/u AM PTT for heparin dosing  -hypercoagulable workup ordered; will f/u Pt with h/o of unprovoked DVT in 2016. No fx of PE or DVT  -will consider obtaining b/l doppler studies  -PTT up from 33.7 to 45.5 (6/29 @ 10AM); will repeat PTT at 4pm with target range of 58-99  -hypercoagulable workup ordered; will f/u results

## 2019-06-30 DIAGNOSIS — I50.811 ACUTE RIGHT HEART FAILURE: ICD-10-CM

## 2019-06-30 DIAGNOSIS — I26.09 OTHER PULMONARY EMBOLISM WITH ACUTE COR PULMONALE: ICD-10-CM

## 2019-06-30 LAB
ANION GAP SERPL CALC-SCNC: 9 MMOL/L — SIGNIFICANT CHANGE UP (ref 5–17)
APTT BLD: 81.2 SEC — HIGH (ref 27.5–36.3)
APTT BLD: 83.7 SEC — HIGH (ref 27.5–36.3)
APTT BLD: 85.1 SEC — HIGH (ref 27.5–36.3)
BUN SERPL-MCNC: 15 MG/DL — SIGNIFICANT CHANGE UP (ref 7–23)
CALCIUM SERPL-MCNC: 8.7 MG/DL — SIGNIFICANT CHANGE UP (ref 8.4–10.5)
CHLORIDE SERPL-SCNC: 107 MMOL/L — SIGNIFICANT CHANGE UP (ref 96–108)
CO2 SERPL-SCNC: 25 MMOL/L — SIGNIFICANT CHANGE UP (ref 22–31)
CREAT SERPL-MCNC: 0.9 MG/DL — SIGNIFICANT CHANGE UP (ref 0.5–1.3)
GLUCOSE SERPL-MCNC: 114 MG/DL — HIGH (ref 70–99)
HCT VFR BLD CALC: 42.3 % — SIGNIFICANT CHANGE UP (ref 39–50)
HGB BLD-MCNC: 14.2 G/DL — SIGNIFICANT CHANGE UP (ref 13–17)
INR BLD: 1.18 — HIGH (ref 0.88–1.16)
MAGNESIUM SERPL-MCNC: 1.9 MG/DL — SIGNIFICANT CHANGE UP (ref 1.6–2.6)
MCHC RBC-ENTMCNC: 30.9 PG — SIGNIFICANT CHANGE UP (ref 27–34)
MCHC RBC-ENTMCNC: 33.6 GM/DL — SIGNIFICANT CHANGE UP (ref 32–36)
MCV RBC AUTO: 92.2 FL — SIGNIFICANT CHANGE UP (ref 80–100)
NRBC # BLD: 0 /100 WBCS — SIGNIFICANT CHANGE UP (ref 0–0)
PLATELET # BLD AUTO: 203 K/UL — SIGNIFICANT CHANGE UP (ref 150–400)
POTASSIUM SERPL-MCNC: 4 MMOL/L — SIGNIFICANT CHANGE UP (ref 3.5–5.3)
POTASSIUM SERPL-SCNC: 4 MMOL/L — SIGNIFICANT CHANGE UP (ref 3.5–5.3)
PROTHROM AB SERPL-ACNC: 13.4 SEC — HIGH (ref 10–12.9)
RBC # BLD: 4.59 M/UL — SIGNIFICANT CHANGE UP (ref 4.2–5.8)
RBC # FLD: 12.6 % — SIGNIFICANT CHANGE UP (ref 10.3–14.5)
SODIUM SERPL-SCNC: 141 MMOL/L — SIGNIFICANT CHANGE UP (ref 135–145)
WBC # BLD: 8.41 K/UL — SIGNIFICANT CHANGE UP (ref 3.8–10.5)
WBC # FLD AUTO: 8.41 K/UL — SIGNIFICANT CHANGE UP (ref 3.8–10.5)

## 2019-06-30 PROCEDURE — 93306 TTE W/DOPPLER COMPLETE: CPT | Mod: 26

## 2019-06-30 PROCEDURE — 99233 SBSQ HOSP IP/OBS HIGH 50: CPT | Mod: GC

## 2019-06-30 PROCEDURE — 99232 SBSQ HOSP IP/OBS MODERATE 35: CPT | Mod: GC

## 2019-06-30 RX ORDER — MAGNESIUM SULFATE 500 MG/ML
1 VIAL (ML) INJECTION ONCE
Refills: 0 | Status: COMPLETED | OUTPATIENT
Start: 2019-06-30 | End: 2019-06-30

## 2019-06-30 RX ADMIN — HEPARIN SODIUM 20 UNIT(S)/HR: 5000 INJECTION INTRAVENOUS; SUBCUTANEOUS at 18:03

## 2019-06-30 RX ADMIN — HEPARIN SODIUM 20 UNIT(S)/HR: 5000 INJECTION INTRAVENOUS; SUBCUTANEOUS at 00:00

## 2019-06-30 RX ADMIN — Medication 100 GRAM(S): at 09:45

## 2019-06-30 NOTE — PROGRESS NOTE ADULT - PROBLEM SELECTOR PLAN 1
Pt presented with dizziness and SOB, segmental/subsegmental PE found on CT angio. Elevated BNP and bedside US showing R heart strain.   -TTE on 6/30 showing moderate to severe RV dilatation and hypokinesis   -Upon ambulation to end of hallway today on   -hypercoagulable workup ordered in setting of unprovoked PE, will f/u results  -tachycardic to 120s in the ED (104 max overnight and downtrending), other VS remain stable   -NPO for possible administration of catheter-directed tPA; pt transitioned at midnight to heparin gtt (12 hours after Lovenox administration)  -will obtain formal echocardiogram to better characterize right sided heart function   -consider b/l doppler to evaluate for DVT  -troponins downtrended from 0.17 to 0.12 6/28; no need to trend further  -will walk patient today Pt presented with dizziness and SOB, segmental/subsegmental PE found on CT angio. Elevated BNP and bedside US showing R heart strain.   -TTE on 6/30 showing moderate to severe RV dilatation and hypokinesis   -Upon ambulation to end of hallway today on O2, sat went down to 92-95% and . When ambulating down hallway without O2, sat 88-94% and -113  -hypercoagulable workup ordered in setting of unprovoked PE, will f/u results  -NPO after midnight for possible administration of catheter-directed tPA as per pulm recs  -troponins downtrended from 0.17 to 0.12 6/28; no need to trend further  -PTT at 8pm (would be third in therapeutic range)

## 2019-06-30 NOTE — PROGRESS NOTE ADULT - PROBLEM SELECTOR PLAN 1
Hx of previous DVT now with large PE and suspect recurrent DVT of RLE  - Did not receive complete treatment 3 yrs ago, but would still consider this a "second episode" of unprovoked DVT/PE given how much time has passed  - Troponemia, tachycardia, hypoxia, and bedside echo all concerning for submassive PE  - PESI score 94 (class III, intermediate risk)    Recommendations  - No indication for CDT at this time  - Mild tachycardia is expected with clot burden  - On Heparin, Can consider switching to Apixaban  - O2 as needed  - Agree with hypercoag work up  - Pending Echo and DVT study  - OOB and ambulate Hx of previous DVT now with large PE and suspect recurrent DVT of RLE  - Did not receive complete treatment 3 yrs ago, but would still consider this a "second episode" of unprovoked DVT/PE given how much time has passed  - Troponemia, tachycardia, hypoxia, and bedside echo all concerning for submassive PE  - PESI score 94 (class III, intermediate risk)    Recommend:  - Mild tachycardia is expected with clot burden  - On Heparin, continue same  - O2 as needed  - Agree with hypercoag work up  - Echo with severe RV dysfunction and dilation.  - Please make patient NPO after 12 for potential CDT in the am  - We will consult vascular surgery and make them aware of the patient.

## 2019-06-30 NOTE — PROGRESS NOTE ADULT - SUBJECTIVE AND OBJECTIVE BOX
Interval Events:  Patient seen and examined at bedside. patient says that this breathing is significantly better this am. No chest pain. Walked without difficulty    MEDICATIONS:  Pulmonary:    Antimicrobials:    Anticoagulants:  heparin  Infusion 2000 Unit(s)/Hr IV Continuous <Continuous>    Cardiac:    Endocrine:    Allergies    No Known Allergies    Intolerances        Vital Signs Last 24 Hrs  T(C): 37.2 (30 Jun 2019 14:05), Max: 37.4 (30 Jun 2019 02:00)  T(F): 99 (30 Jun 2019 14:05), Max: 99.4 (30 Jun 2019 02:00)  HR: 92 (30 Jun 2019 12:16) (84 - 114)  BP: 140/84 (30 Jun 2019 12:16) (109/73 - 144/97)  BP(mean): 107 (30 Jun 2019 12:16) (87 - 115)  RR: 16 (30 Jun 2019 12:16) (16 - 24)  SpO2: 100% (30 Jun 2019 12:16) (95% - 100%)    06-29 @ 07:01  -  06-30 @ 07:00  --------------------------------------------------------  IN: 400 mL / OUT: 0 mL / NET: 400 mL    06-30 @ 07:01  - 06-30 @ 15:24  --------------------------------------------------------  IN: 140 mL / OUT: 0 mL / NET: 140 mL          Physical Exam:  General: NAD, AAO x3,   HEENT: No icterus,. Moist mucous membranes  Neck: No JVD noted. Supple, no meningismus  Cardio: S1, S2 noted, tachycardic, no M/R/G  Resp: Trace rales at left base otherwise CTA B/L  Abdo: Soft, NT, bowel sounds present. No organomegaly  Extremities: + asymmetrical swelling of RLE with varicosities noted  Neuro: AAO x3, grossly normal motor strength.  Lymphnodes: no lymphadenopathy identified.  Skin: Dry, no rashes, numerous fibromas noted     LABS:      CBC Full  -  ( 30 Jun 2019 08:20 )  WBC Count : 8.41 K/uL  RBC Count : 4.59 M/uL  Hemoglobin : 14.2 g/dL  Hematocrit : 42.3 %  Platelet Count - Automated : 203 K/uL  Mean Cell Volume : 92.2 fl  Mean Cell Hemoglobin : 30.9 pg  Mean Cell Hemoglobin Concentration : 33.6 gm/dL  Auto Neutrophil # : x  Auto Lymphocyte # : x  Auto Monocyte # : x  Auto Eosinophil # : x  Auto Basophil # : x  Auto Neutrophil % : x  Auto Lymphocyte % : x  Auto Monocyte % : x  Auto Eosinophil % : x  Auto Basophil % : x    06-30    141  |  107  |  15  ----------------------------<  114<H>  4.0   |  25  |  0.90    Ca    8.7      30 Jun 2019 08:20  Mg     1.9     06-30      PT/INR - ( 30 Jun 2019 08:20 )   PT: 13.4 sec;   INR: 1.18          PTT - ( 30 Jun 2019 15:01 )  PTT:85.1 sec                  RADIOLOGY & ADDITIONAL STUDIES (The following images were personally reviewed):

## 2019-06-30 NOTE — PROGRESS NOTE ADULT - PROBLEM SELECTOR PLAN 4
Pt with self-reported h/o of hypertension. Currently with SBP in the 130s.  -currently well-controlled.  -patient does not use any home medications Pt with self-reported h/o of hypertension. Currently with SBP in the 130-110s  -currently well-controlled.  -patient does not use any home medications

## 2019-06-30 NOTE — PROGRESS NOTE ADULT - PROBLEM SELECTOR PLAN 3
WBC elevated to 12.11 on admission with neutrophil predominance. Most likely reactive leukocytosis given PE and no other indications of infection (afebrile, no clinical symptoms)  -WBC downtrended from to 12.11 to 8.87  -continue to monitor CBC WBC elevated to 12.11 on admission with neutrophil predominance. Most likely reactive leukocytosis given PE and no other indications of infection (afebrile, no clinical symptoms)  -WBC downtrended from to 12.11 to 8.4

## 2019-06-30 NOTE — PROGRESS NOTE ADULT - SUBJECTIVE AND OBJECTIVE BOX
INCOMPLETE NOTE    OVERNIGHT EVENTS:    SUBJECTIVE / INTERVAL HPI: Patient seen and examined at bedside.     VITAL SIGNS:  Vital Signs Last 24 Hrs  T(C): 37.2 (30 Jun 2019 06:00), Max: 37.4 (30 Jun 2019 02:00)  T(F): 99 (30 Jun 2019 06:00), Max: 99.4 (30 Jun 2019 02:00)  HR: 84 (30 Jun 2019 04:00) (84 - 114)  BP: 109/73 (30 Jun 2019 04:00) (109/73 - 145/84)  BP(mean): 87 (30 Jun 2019 04:00) (87 - 115)  RR: 18 (30 Jun 2019 04:00) (16 - 24)  SpO2: 97% (30 Jun 2019 04:00) (94% - 98%)    PHYSICAL EXAM:    General: WDWN  HEENT: NC/AT; PERRL, anicteric sclera; MMM  Neck: supple  Cardiovascular: +S1/S2; RRR  Respiratory: CTA B/L; no W/R/R  Gastrointestinal: soft, NT/ND; +BSx4  Extremities: WWP; no edema, clubbing or cyanosis  Vascular: 2+ radial, DP/PT pulses B/L  Neurological: AAOx3; no focal deficits    MEDICATIONS:  MEDICATIONS  (STANDING):  heparin  Infusion 2000 Unit(s)/Hr (20 mL/Hr) IV Continuous <Continuous>    MEDICATIONS  (PRN):      ALLERGIES:  Allergies    No Known Allergies    Intolerances        LABS:                        14.5   8.87  )-----------( 182      ( 29 Jun 2019 06:44 )             43.6     06-29    143  |  107  |  13  ----------------------------<  97  4.0   |  22  |  0.93    Ca    9.0      29 Jun 2019 06:44  Mg     2.0     06-29    TPro  7.4  /  Alb  4.4  /  TBili  0.4  /  DBili  x   /  AST  30  /  ALT  42  /  AlkPhos  86  06-28    PT/INR - ( 28 Jun 2019 09:20 )   PT: 12.8 sec;   INR: 1.13          PTT - ( 29 Jun 2019 22:54 )  PTT:53.3 sec    CAPILLARY BLOOD GLUCOSE          RADIOLOGY & ADDITIONAL TESTS: Reviewed. INCOMPLETE NOTE    OVERNIGHT EVENTS: No acute overnight events. Heparin increased to 20 ml/hour. VS remained stable overnight.     SUBJECTIVE / INTERVAL HPI: Patient seen and examined at bedside. Reports that he is feeling significantly less short of breath today. Has developed right calf cramping overnight. Patient still has dry cough although is coughing less frequently. Denies any headaches, fevers/chills, chest pain, hemoptysis, abdominal pain.     VITAL SIGNS:  Vital Signs Last 24 Hrs  T(C): 37.2 (30 Jun 2019 06:00), Max: 37.4 (30 Jun 2019 02:00)  T(F): 99 (30 Jun 2019 06:00), Max: 99.4 (30 Jun 2019 02:00)  HR: 84 (30 Jun 2019 04:00) (84 - 114)  BP: 109/73 (30 Jun 2019 04:00) (109/73 - 145/84)  BP(mean): 87 (30 Jun 2019 04:00) (87 - 115)  RR: 18 (30 Jun 2019 04:00) (16 - 24)  SpO2: 97% (30 Jun 2019 04:00) (94% - 98%)    PHYSICAL EXAM:    General: WDWN  HEENT: NC/AT; PERRL, anicteric sclera; MMM  Neck: supple  Cardiovascular: +S1/S2; RRR  Respiratory: CTA B/L; no W/R/R  Gastrointestinal: soft, NT/ND; +BSx4  Extremities: WWP; no edema, clubbing or cyanosis  Vascular: 2+ radial, DP/PT pulses B/L  Neurological: AAOx3; no focal deficits    MEDICATIONS:  MEDICATIONS  (STANDING):  heparin  Infusion 2000 Unit(s)/Hr (20 mL/Hr) IV Continuous <Continuous>    MEDICATIONS  (PRN):      ALLERGIES:  Allergies    No Known Allergies    Intolerances        LABS:                        14.5   8.87  )-----------( 182      ( 29 Jun 2019 06:44 )             43.6     06-29    143  |  107  |  13  ----------------------------<  97  4.0   |  22  |  0.93    Ca    9.0      29 Jun 2019 06:44  Mg     2.0     06-29    TPro  7.4  /  Alb  4.4  /  TBili  0.4  /  DBili  x   /  AST  30  /  ALT  42  /  AlkPhos  86  06-28    PT/INR - ( 28 Jun 2019 09:20 )   PT: 12.8 sec;   INR: 1.13          PTT - ( 29 Jun 2019 22:54 )  PTT:53.3 sec    CAPILLARY BLOOD GLUCOSE          RADIOLOGY & ADDITIONAL TESTS: Reviewed. INCOMPLETE NOTE    OVERNIGHT EVENTS: No acute overnight events. Heparin increased to 20 ml/hour. VS remained stable overnight.     SUBJECTIVE / INTERVAL HPI: Patient seen and examined at bedside. Reports that he is feeling significantly less short of breath today. Has developed right calf cramping overnight. Patient still has dry cough although is coughing less frequently. Denies any headaches, fevers/chills, chest pain, hemoptysis, abdominal pain.     VITAL SIGNS:  Vital Signs Last 24 Hrs  T(C): 37.2 (30 Jun 2019 06:00), Max: 37.4 (30 Jun 2019 02:00)  T(F): 99 (30 Jun 2019 06:00), Max: 99.4 (30 Jun 2019 02:00)  HR: 84 (30 Jun 2019 04:00) (84 - 114)  BP: 109/73 (30 Jun 2019 04:00) (109/73 - 145/84)  BP(mean): 87 (30 Jun 2019 04:00) (87 - 115)  RR: 18 (30 Jun 2019 04:00) (16 - 24)  SpO2: 97% (30 Jun 2019 04:00) (94% - 98%)    PHYSICAL EXAM:    General: Resting comfortably in bed  HEENT: NC/AT; PERRL, anicteric sclera; MMM  Neck: supple  Cardiovascular: +S1/S2; RRR  Respiratory: CTA B/L; no W/R/R  Gastrointestinal: soft, NT/ND; +BSx4  Extremities: WWP; no edema, clubbing or cyanosis. No calf tenderness.   Vascular: 2+ radial, DP/PT pulses B/L  Neurological: AAOx3; no focal deficits    MEDICATIONS:  MEDICATIONS  (STANDING):  heparin  Infusion 2000 Unit(s)/Hr (20 mL/Hr) IV Continuous <Continuous>    MEDICATIONS  (PRN):      ALLERGIES:  Allergies    No Known Allergies    Intolerances        LABS:                        14.5   8.87  )-----------( 182      ( 29 Jun 2019 06:44 )             43.6     06-29    143  |  107  |  13  ----------------------------<  97  4.0   |  22  |  0.93    Ca    9.0      29 Jun 2019 06:44  Mg     2.0     06-29    TPro  7.4  /  Alb  4.4  /  TBili  0.4  /  DBili  x   /  AST  30  /  ALT  42  /  AlkPhos  86  06-28    PT/INR - ( 28 Jun 2019 09:20 )   PT: 12.8 sec;   INR: 1.13          PTT - ( 29 Jun 2019 22:54 )  PTT:53.3 sec    CAPILLARY BLOOD GLUCOSE          RADIOLOGY & ADDITIONAL TESTS: Reviewed.

## 2019-06-30 NOTE — PROGRESS NOTE ADULT - PROBLEM SELECTOR PLAN 3
O2 sat in 80's upon presentation, now 90% on room air and improves to 96% with nasal cannula  - Likely due to large pulmonary embolism   - While RV strain present, no acute cor pulmonale noted    Recommendations  - Management of PE as stated below  - Nasal cannula to maintain O2 > 90%  - Bedside spirometry and OOBTC  - Ambulate tomorrow and assess for acute desaturation with exertion.

## 2019-06-30 NOTE — PROGRESS NOTE ADULT - PROBLEM SELECTOR PLAN 5
F: None  E: Replete for K<4 and Mag <2  N: NPO given possible need for TPA  A: heparin gtt  Dispo: [facility]  Code: FULL CODE F: None  E: Replete for K<4 and Mag <2  N: NPO after midnight given possible need for TPA  A: heparin gtt  Dispo: 7[facility]  Code: FULL CODE

## 2019-06-30 NOTE — PROGRESS NOTE ADULT - PROBLEM SELECTOR PLAN 2
Pt with h/o of unprovoked DVT in 2016. No fx of PE or DVT  -will consider obtaining b/l doppler studies  -PTT up from 33.7 to 45.5 (6/29 @ 10AM); will repeat PTT at 4pm with target range of 58-99  -hypercoagulable workup ordered; will f/u results Pt with h/o of unprovoked DVT in 2016. No fx of PE or DVT  -on hep at 20 ml/hr  -PTT at 8pm today (would be third value in therapeutic range)  -hypercoagulable workup ordered; will f/u results

## 2019-07-01 ENCOUNTER — TRANSCRIPTION ENCOUNTER (OUTPATIENT)
Age: 44
End: 2019-07-01

## 2019-07-01 LAB
ANION GAP SERPL CALC-SCNC: 14 MMOL/L — SIGNIFICANT CHANGE UP (ref 5–17)
APTT BLD: 93.9 SEC — HIGH (ref 27.5–36.3)
AT III ACT/NOR PPP CHRO: 95 % — SIGNIFICANT CHANGE UP (ref 85–135)
B2 GLYCOPROT1 AB SER QL: NEGATIVE — SIGNIFICANT CHANGE UP
BLD GP AB SCN SERPL QL: NEGATIVE — SIGNIFICANT CHANGE UP
BLD GP AB SCN SERPL QL: NEGATIVE — SIGNIFICANT CHANGE UP
BUN SERPL-MCNC: 13 MG/DL — SIGNIFICANT CHANGE UP (ref 7–23)
CALCIUM SERPL-MCNC: 9 MG/DL — SIGNIFICANT CHANGE UP (ref 8.4–10.5)
CARDIOLIPIN AB SER-ACNC: NEGATIVE — SIGNIFICANT CHANGE UP
CHLORIDE SERPL-SCNC: 105 MMOL/L — SIGNIFICANT CHANGE UP (ref 96–108)
CO2 SERPL-SCNC: 23 MMOL/L — SIGNIFICANT CHANGE UP (ref 22–31)
CREAT SERPL-MCNC: 0.85 MG/DL — SIGNIFICANT CHANGE UP (ref 0.5–1.3)
GLUCOSE SERPL-MCNC: 111 MG/DL — HIGH (ref 70–99)
HCT VFR BLD CALC: 40.7 % — SIGNIFICANT CHANGE UP (ref 39–50)
HGB BLD-MCNC: 14 G/DL — SIGNIFICANT CHANGE UP (ref 13–17)
MAGNESIUM SERPL-MCNC: 1.9 MG/DL — SIGNIFICANT CHANGE UP (ref 1.6–2.6)
MCHC RBC-ENTMCNC: 31.4 PG — SIGNIFICANT CHANGE UP (ref 27–34)
MCHC RBC-ENTMCNC: 34.4 GM/DL — SIGNIFICANT CHANGE UP (ref 32–36)
MCV RBC AUTO: 91.3 FL — SIGNIFICANT CHANGE UP (ref 80–100)
NRBC # BLD: 0 /100 WBCS — SIGNIFICANT CHANGE UP (ref 0–0)
PHOSPHATE SERPL-MCNC: 3.4 MG/DL — SIGNIFICANT CHANGE UP (ref 2.5–4.5)
PLATELET # BLD AUTO: 211 K/UL — SIGNIFICANT CHANGE UP (ref 150–400)
POTASSIUM SERPL-MCNC: 3.5 MMOL/L — SIGNIFICANT CHANGE UP (ref 3.5–5.3)
POTASSIUM SERPL-SCNC: 3.5 MMOL/L — SIGNIFICANT CHANGE UP (ref 3.5–5.3)
PROT C ACT/NOR PPP: 93 % — SIGNIFICANT CHANGE UP (ref 74–150)
RBC # BLD: 4.46 M/UL — SIGNIFICANT CHANGE UP (ref 4.2–5.8)
RBC # FLD: 12.3 % — SIGNIFICANT CHANGE UP (ref 10.3–14.5)
RH IG SCN BLD-IMP: POSITIVE — SIGNIFICANT CHANGE UP
RH IG SCN BLD-IMP: POSITIVE — SIGNIFICANT CHANGE UP
SODIUM SERPL-SCNC: 142 MMOL/L — SIGNIFICANT CHANGE UP (ref 135–145)
WBC # BLD: 7.68 K/UL — SIGNIFICANT CHANGE UP (ref 3.8–10.5)
WBC # FLD AUTO: 7.68 K/UL — SIGNIFICANT CHANGE UP (ref 3.8–10.5)

## 2019-07-01 PROCEDURE — 99232 SBSQ HOSP IP/OBS MODERATE 35: CPT | Mod: GC

## 2019-07-01 PROCEDURE — 83090 ASSAY OF HOMOCYSTEINE: CPT

## 2019-07-01 PROCEDURE — 85610 PROTHROMBIN TIME: CPT

## 2019-07-01 PROCEDURE — 80048 BASIC METABOLIC PNL TOTAL CA: CPT

## 2019-07-01 PROCEDURE — 84295 ASSAY OF SERUM SODIUM: CPT

## 2019-07-01 PROCEDURE — 71275 CT ANGIOGRAPHY CHEST: CPT

## 2019-07-01 PROCEDURE — 82330 ASSAY OF CALCIUM: CPT

## 2019-07-01 PROCEDURE — 83690 ASSAY OF LIPASE: CPT

## 2019-07-01 PROCEDURE — 85306 CLOT INHIBIT PROT S FREE: CPT

## 2019-07-01 PROCEDURE — 71045 X-RAY EXAM CHEST 1 VIEW: CPT

## 2019-07-01 PROCEDURE — 84100 ASSAY OF PHOSPHORUS: CPT

## 2019-07-01 PROCEDURE — 87040 BLOOD CULTURE FOR BACTERIA: CPT

## 2019-07-01 PROCEDURE — 85307 ASSAY ACTIVATED PROTEIN C: CPT

## 2019-07-01 PROCEDURE — 80053 COMPREHEN METABOLIC PANEL: CPT

## 2019-07-01 PROCEDURE — 85730 THROMBOPLASTIN TIME PARTIAL: CPT

## 2019-07-01 PROCEDURE — 85613 RUSSELL VIPER VENOM DILUTED: CPT

## 2019-07-01 PROCEDURE — 93306 TTE W/DOPPLER COMPLETE: CPT

## 2019-07-01 PROCEDURE — 86146 BETA-2 GLYCOPROTEIN ANTIBODY: CPT

## 2019-07-01 PROCEDURE — 86900 BLOOD TYPING SEROLOGIC ABO: CPT

## 2019-07-01 PROCEDURE — 96360 HYDRATION IV INFUSION INIT: CPT | Mod: XU

## 2019-07-01 PROCEDURE — 99253 IP/OBS CNSLTJ NEW/EST LOW 45: CPT | Mod: GC

## 2019-07-01 PROCEDURE — 99285 EMERGENCY DEPT VISIT HI MDM: CPT | Mod: 25

## 2019-07-01 PROCEDURE — 85027 COMPLETE CBC AUTOMATED: CPT

## 2019-07-01 PROCEDURE — 84484 ASSAY OF TROPONIN QUANT: CPT

## 2019-07-01 PROCEDURE — 93005 ELECTROCARDIOGRAM TRACING: CPT

## 2019-07-01 PROCEDURE — 83605 ASSAY OF LACTIC ACID: CPT

## 2019-07-01 PROCEDURE — 85379 FIBRIN DEGRADATION QUANT: CPT

## 2019-07-01 PROCEDURE — 86901 BLOOD TYPING SEROLOGIC RH(D): CPT

## 2019-07-01 PROCEDURE — 82803 BLOOD GASES ANY COMBINATION: CPT

## 2019-07-01 PROCEDURE — 85303 CLOT INHIBIT PROT C ACTIVITY: CPT

## 2019-07-01 PROCEDURE — 94640 AIRWAY INHALATION TREATMENT: CPT

## 2019-07-01 PROCEDURE — 86850 RBC ANTIBODY SCREEN: CPT

## 2019-07-01 PROCEDURE — 84132 ASSAY OF SERUM POTASSIUM: CPT

## 2019-07-01 PROCEDURE — 99233 SBSQ HOSP IP/OBS HIGH 50: CPT | Mod: GC

## 2019-07-01 PROCEDURE — 96372 THER/PROPH/DIAG INJ SC/IM: CPT | Mod: XU

## 2019-07-01 PROCEDURE — 36415 COLL VENOUS BLD VENIPUNCTURE: CPT

## 2019-07-01 PROCEDURE — 87389 HIV-1 AG W/HIV-1&-2 AB AG IA: CPT

## 2019-07-01 PROCEDURE — 85300 ANTITHROMBIN III ACTIVITY: CPT

## 2019-07-01 PROCEDURE — 93010 ELECTROCARDIOGRAM REPORT: CPT

## 2019-07-01 PROCEDURE — 85025 COMPLETE CBC W/AUTO DIFF WBC: CPT

## 2019-07-01 PROCEDURE — 85598 HEXAGNAL PHOSPH PLTLT NEUTRL: CPT

## 2019-07-01 PROCEDURE — 83880 ASSAY OF NATRIURETIC PEPTIDE: CPT

## 2019-07-01 PROCEDURE — 83735 ASSAY OF MAGNESIUM: CPT

## 2019-07-01 RX ORDER — MAGNESIUM SULFATE 500 MG/ML
1 VIAL (ML) INJECTION ONCE
Refills: 0 | Status: COMPLETED | OUTPATIENT
Start: 2019-07-01 | End: 2019-07-01

## 2019-07-01 RX ORDER — POTASSIUM CHLORIDE 20 MEQ
20 PACKET (EA) ORAL ONCE
Refills: 0 | Status: DISCONTINUED | OUTPATIENT
Start: 2019-07-01 | End: 2019-07-01

## 2019-07-01 RX ORDER — APIXABAN 2.5 MG/1
2 TABLET, FILM COATED ORAL
Qty: 120 | Refills: 0
Start: 2019-07-01 | End: 2019-07-30

## 2019-07-01 RX ORDER — POTASSIUM CHLORIDE 20 MEQ
10 PACKET (EA) ORAL
Refills: 0 | Status: DISCONTINUED | OUTPATIENT
Start: 2019-07-01 | End: 2019-07-01

## 2019-07-01 RX ORDER — APIXABAN 2.5 MG/1
10 TABLET, FILM COATED ORAL EVERY 12 HOURS
Refills: 0 | Status: DISCONTINUED | OUTPATIENT
Start: 2019-07-01 | End: 2019-07-01

## 2019-07-01 RX ORDER — POTASSIUM CHLORIDE 20 MEQ
40 PACKET (EA) ORAL ONCE
Refills: 0 | Status: COMPLETED | OUTPATIENT
Start: 2019-07-01 | End: 2019-07-01

## 2019-07-01 RX ORDER — APIXABAN 2.5 MG/1
5 TABLET, FILM COATED ORAL EVERY 12 HOURS
Refills: 0 | Status: CANCELLED | OUTPATIENT
Start: 2019-07-08 | End: 2019-07-01

## 2019-07-01 RX ADMIN — Medication 100 GRAM(S): at 07:30

## 2019-07-01 RX ADMIN — APIXABAN 10 MILLIGRAM(S): 2.5 TABLET, FILM COATED ORAL at 18:34

## 2019-07-01 RX ADMIN — Medication 40 MILLIEQUIVALENT(S): at 07:30

## 2019-07-01 NOTE — DISCHARGE NOTE PROVIDER - CARE PROVIDER_API CALL
Ramsey Jovel  [address] [place] [address]  Phone: [phone]  Fax: (   )    -  Follow Up Time: 2 weeks Ramsey Jovel  [address] [place] [address]  Phone: [phone]  Fax: (   )    -  Follow Up Time: 2 weeks    Celia Jones)  Critical Care Medicine; Pulmonary Disease  [address] [place] [address]  Phone: [phone]  Fax: [phone]  Follow Up Time:

## 2019-07-01 NOTE — PROGRESS NOTE ADULT - PROBLEM SELECTOR PLAN 4
Pt with self-reported h/o of hypertension. Currently with SBP in the 140-110s  -currently well-controlled.  -patient does not use any home medications

## 2019-07-01 NOTE — PROGRESS NOTE ADULT - ASSESSMENT
44 yo M with previous hx of DVT, neurofibromatosis presents with acute onset presyncope associated with tachycardia, tachypnea, hypoxia, found to have mild elevation of BNP and troponin with B/L PE and RV dilation, mild RV strain on echo consistent with submassive PE
44yo male with h/o neurofibromatosis I, HTN, and RLE DVT in 2016 presenting with SOB and dizziness, found to have submassive PE.
42yo male with h/o neurofibromatosis I, HTN, and RLE DVT in 2016 presenting with SOB and dizziness, found to have submassive PE.
44 yo M with previous hx of DVT, neurofibromatosis presents with acute onset presyncope associated with tachycardia, tachypnea, hypoxia, found to have mild elevation of BNP and troponin with B/L PE and RV dilation, mild RV strain on echo consistent with submassive PE
42yo male with h/o neurofibromatosis I, HTN, and RLE DVT in 2016 presenting with SOB and dizziness, found to have submassive PE.

## 2019-07-01 NOTE — PROGRESS NOTE ADULT - PROBLEM SELECTOR PLAN 1
Hx of previous DVT now with large PE and suspect recurrent DVT of RLE  - Did not receive complete treatment 3 yrs ago, but would still consider this a "second episode" of unprovoked DVT/PE given how much time has passed  - Troponemia, tachycardia, hypoxia, and bedside echo all concerning for submassive PE  - PESI score 94 (class III, intermediate risk)    Recommend:  - Mild tachycardia is expected with clot burden  - On Heparin, continue same  - O2 as needed  - Agree with hypercoag work up  - Echo with severe RV dysfunction and dilation.  - Patient was offered CDT but he has refused at this time.   - Continue with heparin in the interim and we will re evaluate in am.

## 2019-07-01 NOTE — PROGRESS NOTE ADULT - PROBLEM SELECTOR PLAN 6
1) PCP Contacted on Admission: (Y/N) --> Name & Phone #: Dr. Ramsey Jovel  2) Date of Contact with PCP: TBD  3) PCP Contacted at Discharge: TBD  4) Summary of Handoff Given to PCP: TBD   5) Post-Discharge Appointment Date: TBD

## 2019-07-01 NOTE — DISCHARGE NOTE PROVIDER - CARE PROVIDERS DIRECT ADDRESSES
,DirectAddress_Unknown ,DirectAddress_Unknown,[email or web address]

## 2019-07-01 NOTE — DISCHARGE NOTE PROVIDER - NSDCFUADDAPPT_GEN_ALL_CORE_FT
Follow up with your PCP within 1-2 weeks of discharge.     Follow up with Dr. Celia Jones, pulmonologist, within 1-2 weeks of discharge.

## 2019-07-01 NOTE — PROGRESS NOTE ADULT - PROBLEM SELECTOR PLAN 3
O2 sat in 80's upon presentation, now 90% on room air and improves to 96% with nasal cannula  - Likely due to large pulmonary embolism   - While RV strain present, no acute cor pulmonale noted    Recommendations  - Management of PE as stated above  - Nasal cannula to maintain O2 > 90%  - Bedside spirometry and OOBTC

## 2019-07-01 NOTE — PROGRESS NOTE ADULT - PROBLEM SELECTOR PROBLEM 1
Other acute pulmonary embolism with acute cor pulmonale
Pulmonary embolism
Pulmonary embolism
Other acute pulmonary embolism with acute cor pulmonale
Pulmonary embolism

## 2019-07-01 NOTE — PROGRESS NOTE ADULT - REASON FOR ADMISSION
Submassive pulmonary embolism

## 2019-07-01 NOTE — PROGRESS NOTE ADULT - ATTENDING COMMENTS
Seen and examined w Dr Carmona; agree w above. Improving on UFH after submassive PE. Would transition to NOAC and prepare for discharge. [place] be fine to fly to [place] on AC as room air sat is fine. Counseled as to bleeding risk, activity precautions. Recommend indefinite AC for 2nd unprovoked clot. D/w pt and wife.
Pt comfortable on RA with O2 sat 99%. however still tachycardic 114 on ambulation with O2 sat 94% on O2. Pt feels asymptomatic. Will ambulate off O2. However, Echo with mod-severe RV dilation/dysfunction. NPO after midnight for possible catheter directed thrombolysis per Pulm. Therapeutic PTT on Heparin gtt.
Patient seen and examined with house-staff during bedside rounds.  Resident note read, including vitals, physical findings, laboratory data, and radiological reports.   Revisions included below.  Direct personal management at bed side and extensive interpretation of the data.  Plan was outlined and discussed in details with the housestaff.  Decision making of high complexity  Action taken for acute disease activity to reflect the level of care provided:  - medication reconciliation  - review laboratory data  I reviewed the CT scan of the chest. Patient is on intravenous heparin for the last 72 hours. Patient had evidence of right ventricular strain. I discussed with the patient is condition in more details. I indicated to have at this point is still hypoxic with exertion and his saturation drops more than 4% percent. I discussed the case with vascular surgery and critical care.  I explained to him that concern about the pulmonary hypertension and he might have acute on top of chronic thromboembolic disease. I discussed the option of having interventional revascularization but he refused. Patient was change to oral agents to follow as an outpatient
Patient seen and examined with house-staff during bedside rounds  Resident note read, including vitals, physical findings, laboratory data, and radiological reports.   Revisions included below.  Case discussed with House staff  Direct personal management at bedside  and extensive interpretation of data. Decision making of high complexity.    Okay to discharge on PO Eliquis
Pt tachycardic with exertion to 120. HR  at rest. Await Echo. Heparin gtt increased and repeat PTT. O2 sat 92% on RA. Pulm f/u.

## 2019-07-01 NOTE — CONSULT NOTE ADULT - SUBJECTIVE AND OBJECTIVE BOX
Vascular Attending:  Jamie      HPI:  Pt is a 43yoM with pmhc neurofibromatosis, HTN and RLE DVT (July 2016) who presents to [facility] ED with SOB and dizziness. Pt states that when he returned to his apartment this morning after walking out to  breakfast, he suddenly felt SOB and dizzy. He states that he sat down for 15 minutes in his building's lobby and his symptoms resolved. When he walked to the elevator, symptoms of SOB and dizziness returned and pt states that he passed out without losing consciousness. He also reports that he had associated chest tightness. He denies ever having an episode like this before. Of note, pt states that 2 weeks ago, he developed a productive cough that has persisted and since become dry. He notes that he has had increased stress at work and may sit at his desk for up to 12 hours in a day. He also reports supplement use with chlorophyl and amino acids, which he started 2 weeks ago. Pt states that in July of 2016, he hd an unprovoked RLE DVT that was treated with 30 days of an injectable medication that he believes was Lovenox. Pt denies recent travel or recent surgery. Denies family history of blood clots. On ROS, pt denies f/c, night sweats, n/v, abdominal pain, diarrhea, constipation.    In the ED, vitals were T 97.8, , /76. RR 18, spO2 100%. Labs were remarkable for WBC 12.11 (neutrophil predominance), D-dimer 3580, trop 0.17, . Lactate was 2.0. VBG 7.35/48/23/26/39%. EKG showed sinus tachycardia with . CT PE showed acute diffuse segmental and subsegmental pulmonary emboli b/l and reflux of contrast into the IVC consistent with R heart strain. Bedside US showed R heart strain.     Pt was given 80mg Lovenox (1mg/kg dosing), 1L NS, 1 Albuterol neb and benadryl 50mg IVP. (28 Jun 2019 14:33)    Vascular consulted for < from: CT Angio Chest PE Protocol w/ IV Cont (06.28.19 @ 10:36) >  IMPRESSION:     Acute diffuse segmental and subsegmental pulmonary bilaterally.    Reflux of contrast into the IVC consistent with right heart strain.     < end of copied text >  No smoking history no recent travel.    PAST MEDICAL & SURGICAL HISTORY:  Neurofibromatosis      MEDICATIONS  (STANDING):  heparin  Infusion 2000 Unit(s)/Hr (20 mL/Hr) IV Continuous <Continuous>    MEDICATIONS  (PRN):      Allergies    No Known Allergies    Intolerances        SOCIAL HISTORY:    FAMILY HISTORY:  Family history of neurofibromatosis      Vital Signs Last 24 Hrs  T(C): 36.1 (01 Jul 2019 10:12), Max: 37.6 (01 Jul 2019 02:00)  T(F): 96.9 (01 Jul 2019 10:12), Max: 99.7 (01 Jul 2019 02:00)  HR: 92 (01 Jul 2019 08:07) (92 - 100)  BP: 137/84 (01 Jul 2019 08:07) (127/76 - 146/84)  BP(mean): 104 (01 Jul 2019 08:07) (94 - 108)  RR: 16 (01 Jul 2019 08:07) (16 - 18)  SpO2: 97% (01 Jul 2019 08:07) (93% - 100%)    PHYSICAL EXAM:      Constitutional: NAD    Respiratory: cta b/l nonlabored breathing    Cardiovascular: s1s2 tachycardia    Gastrointestinal: soft nt/nd    Extremities: WWP no calf tenderness    Vascular: 2+ pal pules throughout    Neurological: intact        LABS:                        14.0   7.68  )-----------( 211      ( 01 Jul 2019 05:53 )             40.7     07-01    142  |  105  |  13  ----------------------------<  111<H>  3.5   |  23  |  0.85    Ca    9.0      01 Jul 2019 05:53  Phos  3.4     07-01  Mg     1.9     07-01      PT/INR - ( 30 Jun 2019 08:20 )   PT: 13.4 sec;   INR: 1.18          PTT - ( 01 Jul 2019 05:53 )  PTT:93.9 sec      RADIOLOGY & ADDITIONAL STUDIES

## 2019-07-01 NOTE — PROGRESS NOTE ADULT - PROBLEM SELECTOR PLAN 5
F: None  E: Replete for K<4 and Mag <2  N: NPO after midnight given possible need for TPA  A: heparin gtt  Dispo: 7[facility]ach  Code: FULL CODE

## 2019-07-01 NOTE — DISCHARGE NOTE NURSING/CASE MANAGEMENT/SOCIAL WORK - NSDCDPATPORTLINK_GEN_ALL_CORE
You can access the [facility] Portal, offered by [facility], by registering with the following website: [email or web address]

## 2019-07-01 NOTE — PROGRESS NOTE ADULT - PROBLEM SELECTOR PROBLEM 3
Acute respiratory failure with hypoxia
Acute respiratory failure with hypoxia
Leukocytosis

## 2019-07-01 NOTE — PROGRESS NOTE ADULT - SUBJECTIVE AND OBJECTIVE BOX
INCOMPLETE NOTE    OVERNIGHT EVENTS: No acute events overnight. O2 sat at % overnight on room air.     SUBJECTIVE / INTERVAL HPI: Patient seen and examined at bedside. Reports that he is generally feeling "much better." Denies feeling short of breath but does still occasionally cough, although at a lesser frequency. Right leg cramping also improved although still present. Denies fevers/chills, headaches, dizziness, chest pain, palpitations, abdominal pain, nausea/vomiting. Has been urinating and moving bowels appropriately.     VITAL SIGNS:  Vital Signs Last 24 Hrs  T(C): 37.2 (01 Jul 2019 05:31), Max: 37.6 (01 Jul 2019 02:00)  T(F): 98.9 (01 Jul 2019 05:31), Max: 99.7 (01 Jul 2019 02:00)  HR: 96 (01 Jul 2019 04:15) (92 - 100)  BP: 127/76 (01 Jul 2019 04:15) (127/76 - 146/84)  BP(mean): 94 (01 Jul 2019 04:15) (94 - 108)  RR: 18 (01 Jul 2019 04:15) (16 - 18)  SpO2: 93% (01 Jul 2019 04:15) (93% - 100%)    PHYSICAL EXAM:    General: WDWN, laying comfortably in bed  HEENT: NC/AT; PERRL, anicteric sclera; MMM  Neck: supple  Cardiovascular: +S1/S2; RRR, no murmurs   Respiratory: CTA B/L; no W/R/R  Gastrointestinal: soft, NT/ND; +BSx4  Extremities: WWP; no edema, clubbing or cyanosis. No calf tenderness on b/l palpation  Vascular: 2+ radial, DP/PT pulses B/L  Neurological: AAOx3; no focal deficits    MEDICATIONS:  MEDICATIONS  (STANDING):  heparin  Infusion 2000 Unit(s)/Hr (20 mL/Hr) IV Continuous <Continuous>    MEDICATIONS  (PRN):      ALLERGIES:  Allergies    No Known Allergies    Intolerances        LABS:                        14.0   7.68  )-----------( 211      ( 01 Jul 2019 05:53 )             40.7     06-30    141  |  107  |  15  ----------------------------<  114<H>  4.0   |  25  |  0.90    Ca    8.7      30 Jun 2019 08:20  Mg     1.9     06-30      PT/INR - ( 30 Jun 2019 08:20 )   PT: 13.4 sec;   INR: 1.18          PTT - ( 01 Jul 2019 05:53 )  PTT:93.9 sec    CAPILLARY BLOOD GLUCOSE          RADIOLOGY & ADDITIONAL TESTS: Reviewed. OVERNIGHT EVENTS: No acute events overnight. O2 sat at % overnight on room air.     SUBJECTIVE / INTERVAL HPI: Patient seen and examined at bedside. Reports that he is generally feeling "much better." Denies feeling short of breath but does still occasionally cough, although at a lesser frequency. Right leg cramping also improved although still present. Denies fevers/chills, headaches, dizziness, chest pain, palpitations, abdominal pain, nausea/vomiting. Has been urinating and moving bowels appropriately.     VITAL SIGNS:  Vital Signs Last 24 Hrs  T(C): 37.2 (01 Jul 2019 05:31), Max: 37.6 (01 Jul 2019 02:00)  T(F): 98.9 (01 Jul 2019 05:31), Max: 99.7 (01 Jul 2019 02:00)  HR: 96 (01 Jul 2019 04:15) (92 - 100)  BP: 127/76 (01 Jul 2019 04:15) (127/76 - 146/84)  BP(mean): 94 (01 Jul 2019 04:15) (94 - 108)  RR: 18 (01 Jul 2019 04:15) (16 - 18)  SpO2: 93% (01 Jul 2019 04:15) (93% - 100%)    PHYSICAL EXAM:    General: WDWN, laying comfortably in bed  HEENT: NC/AT; PERRL, anicteric sclera; MMM  Neck: supple  Cardiovascular: +S1/S2; RRR, no murmurs   Respiratory: CTA B/L; no W/R/R  Gastrointestinal: soft, NT/ND; +BSx4  Extremities: WWP; no edema, clubbing or cyanosis. No calf tenderness on b/l palpation  Vascular: 2+ radial, DP/PT pulses B/L  Neurological: AAOx3; no focal deficits    MEDICATIONS:  MEDICATIONS  (STANDING):  heparin  Infusion 2000 Unit(s)/Hr (20 mL/Hr) IV Continuous <Continuous>    MEDICATIONS  (PRN):      ALLERGIES:  Allergies    No Known Allergies    Intolerances        LABS:                        14.0   7.68  )-----------( 211      ( 01 Jul 2019 05:53 )             40.7     06-30    141  |  107  |  15  ----------------------------<  114<H>  4.0   |  25  |  0.90    Ca    8.7      30 Jun 2019 08:20  Mg     1.9     06-30      PT/INR - ( 30 Jun 2019 08:20 )   PT: 13.4 sec;   INR: 1.18          PTT - ( 01 Jul 2019 05:53 )  PTT:93.9 sec    CAPILLARY BLOOD GLUCOSE          RADIOLOGY & ADDITIONAL TESTS: Reviewed.

## 2019-07-01 NOTE — PROGRESS NOTE ADULT - PROBLEM SELECTOR PLAN 2
Pt with h/o of unprovoked DVT in 2016. No fx of PE or DVT  -on hep at 20 ml/hr  -PTT in am  -hypercoagulable workup ordered; will f/u results

## 2019-07-01 NOTE — CONSULT NOTE ADULT - ATTENDING COMMENTS
Discussion with patient regarding options.    Currently he is feeling better since being on heparin for 48-72 hours. No SOB at rest. Able to tolerate room air. Feels a bit SOB when he walks.    His sats are 98% on RA at rest, with HR of 98.    Given the above, he seems to be improving. We discussed the options of catheter directed thrombolysis, including 1% of bleeding including stroke.    Patient wants to continue current treatment, and hold off intervention for now. I feel this is reasonable. If deteriorates, or no significant improvement tomorrow, will consider pulmonary angiogram at that time.
Patient seen and examined with house-staff during bedside rounds  Resident note read, including vitals, physical findings, laboratory data, and radiological reports.   Revisions included below.  Case discussed with House staff  Direct personal management at bedside  and extensive interpretation of data. Decision making of high complexity.
History of DVT in 2016 (only treated for 1 month as per patient) with b/l pulmonary embolism with small pulmonary infarct. Bedside ECHO showed enlarged right ventricle with minimal flattening of septum and good RV function. No indication for thrombolysis at present. Changed to heparin for now. Plan for ambulation tomorrow and monitor saturation and heart rate. Rest as above.

## 2019-07-01 NOTE — PROGRESS NOTE ADULT - PROBLEM SELECTOR PLAN 1
Pt presented with dizziness and SOB, segmental/subsegmental PE found on CT angio. Elevated BNP and bedside US showing R heart strain.   -TTE on 6/30 showing moderate to severe RV dilatation and hypokinesis   -Upon ambulation to end of hallway today on room air, sat went down to low 90s% and HR up to 120s.   -hypercoagulable workup ordered in setting of unprovoked PE, will f/u results  -NPO after midnight for possible administration of catheter-directed tPA tomorrow as per pulm recs (vascular surgery team will reassess tomorrow. Reported TPA unnecessary today.)  -plan to transition to DOAC when pt leaves hospital  -troponins downtrended from 0.17 to 0.12 6/28; no need to trend further  -PTT in am

## 2019-07-01 NOTE — PROGRESS NOTE ADULT - PROBLEM SELECTOR PROBLEM 2
Acute right heart failure
Acute right heart failure
History of DVT (deep vein thrombosis)

## 2019-07-01 NOTE — CONSULT NOTE ADULT - ASSESSMENT
43yoM with pmhc neurofibromatosis, HTN and RLE DVT (July 2016) who presents to [facility] ED admitted 6/28 for PE\    - No vascular intervention at this time will reevaluate tomorrow to determine need for OR thrombolysis- make NPO c mdnt  - will follow  - d/w Dr. Ayala

## 2019-07-01 NOTE — PROGRESS NOTE ADULT - PROBLEM SELECTOR PLAN 3
WBC elevated to 12.11 on admission with neutrophil predominance. Most likely reactive leukocytosis given PE and no other indications of infection (afebrile, no clinical symptoms)  -WBC downtrended from to 12.11 to 7.6

## 2019-07-01 NOTE — PROGRESS NOTE ADULT - SUBJECTIVE AND OBJECTIVE BOX
Interval Events:  Patient seen and examined at bedside. Patient reports that his breathing is getting better.    MEDICATIONS:  Pulmonary:    Antimicrobials:    Anticoagulants:  heparin  Infusion 2000 Unit(s)/Hr IV Continuous <Continuous>    Cardiac:    Endocrine:    Allergies    No Known Allergies    Intolerances        Vital Signs Last 24 Hrs  T(C): 36.7 (01 Jul 2019 14:00), Max: 37.6 (01 Jul 2019 02:00)  T(F): 98.1 (01 Jul 2019 14:00), Max: 99.7 (01 Jul 2019 02:00)  HR: 98 (01 Jul 2019 12:18) (92 - 100)  BP: 152/83 (01 Jul 2019 12:18) (127/76 - 152/83)  BP(mean): 109 (01 Jul 2019 12:18) (94 - 109)  RR: 16 (01 Jul 2019 12:18) (16 - 18)  SpO2: 95% (01 Jul 2019 12:18) (93% - 97%)    06-30 @ 07:01 - 07-01 @ 07:00  --------------------------------------------------------  IN: 400 mL / OUT: 0 mL / NET: 400 mL    07-01 @ 07:01 - 07-01 @ 15:36  --------------------------------------------------------  IN: 160 mL / OUT: 0 mL / NET: 160 mL          Physical Exam:  General: NAD, AAO x3,   HEENT: No icterus,. Moist mucous membranes  Neck: No JVD noted. Supple, no meningismus  Cardio: S1, S2 noted, tachycardic, no M/R/G  Resp: Trace rales at left base otherwise CTA B/L  Abdo: Soft, NT, bowel sounds present. No organomegaly  Extremities: + asymmetrical swelling of RLE with varicosities noted  Neuro: AAO x3, grossly normal motor strength.  Lymphnodes: no lymphadenopathy identified.  Skin: Dry, no rashes, numerous fibromas noted     LABS:      CBC Full  -  ( 01 Jul 2019 05:53 )  WBC Count : 7.68 K/uL  RBC Count : 4.46 M/uL  Hemoglobin : 14.0 g/dL  Hematocrit : 40.7 %  Platelet Count - Automated : 211 K/uL  Mean Cell Volume : 91.3 fl  Mean Cell Hemoglobin : 31.4 pg  Mean Cell Hemoglobin Concentration : 34.4 gm/dL  Auto Neutrophil # : x  Auto Lymphocyte # : x  Auto Monocyte # : x  Auto Eosinophil # : x  Auto Basophil # : x  Auto Neutrophil % : x  Auto Lymphocyte % : x  Auto Monocyte % : x  Auto Eosinophil % : x  Auto Basophil % : x    07-01    142  |  105  |  13  ----------------------------<  111<H>  3.5   |  23  |  0.85    Ca    9.0      01 Jul 2019 05:53  Phos  3.4     07-01  Mg     1.9     07-01      PT/INR - ( 30 Jun 2019 08:20 )   PT: 13.4 sec;   INR: 1.18          PTT - ( 01 Jul 2019 05:53 )  PTT:93.9 sec                  RADIOLOGY & ADDITIONAL STUDIES (The following images were personally reviewed):

## 2019-07-01 NOTE — DISCHARGE NOTE PROVIDER - PROVIDER TOKENS
FREE:[LAST:[Med],FIRST:[Ramsey],PHONE:[phone]],FAX:[(   )    -],ADDRESS:[[address] [place] [address]],FOLLOWUP:[2 weeks]] FREE:[LAST:[Med],FIRST:[Ramsey],PHONE:[phone]],FAX:[(   )    -],ADDRESS:[[address] [place] [address]],FOLLOWUP:[2 weeks]],PROVIDER:[TOKEN:[[id number]]]

## 2019-07-01 NOTE — DISCHARGE NOTE PROVIDER - NSDCCPCAREPLAN_GEN_ALL_CORE_FT
PRINCIPAL DISCHARGE DIAGNOSIS  Diagnosis: Acute pulmonary embolism without acute cor pulmonale, unspecified pulmonary embolism type  Assessment and Plan of Treatment: You were admitted to the hospital for a blood clot in your lungs that caused you to feel short of breath, dizzy, and eventually lose consciousness. It caused your heart to beat abnormally fast and prevented proper oxygenation of your lungs. We gave you heparin, a blood thinner, and you began to feel better over the course of a few days. Your heart rate returned to a normal pace, you felt less short of breath, and you were getting an appropriate amount of oxygen even while walking. You will be discharged on ____________. The results of your hypercoagulable workup, which looked at genetic factors that could put you at increased risk for developing blood clots, should be available within a week. Please follow up with your primary care doctor, Dr. Ramsey Jovel, in 1-2 weeks. PRINCIPAL DISCHARGE DIAGNOSIS  Diagnosis: Acute pulmonary embolism without acute cor pulmonale, unspecified pulmonary embolism type  Assessment and Plan of Treatment: You were admitted to the hospital for a blood clot in your lungs that caused you to feel short of breath, dizzy, and eventually lose consciousness. It caused your heart to beat abnormally fast and prevented proper oxygenation of your lungs. We gave you heparin, a blood thinner, and you began to feel better over the course of a few days. Your heart rate returned to a normal pace, you felt less short of breath, and you were getting an appropriate amount of oxygen even while walking. You will be discharged on Eliquis The results of your hypercoagulable workup, which looked at genetic factors that could put you at increased risk for developing blood clots, should be available when you follow up with Dr. Jones. Please follow up with your primary care doctor, Dr. Ramsey Jovel, in 1-2 weeks.

## 2019-07-01 NOTE — DISCHARGE NOTE PROVIDER - HOSPITAL COURSE
Pt is 42yo male with pmhx Neurofibromatosis, HTN and RLE DVT in 2016 who presented with SOB and dizziness, found to have submassive PE. In ED, pt was tachycardic to 123 with D-dimer of 3580, trop 0.17, . CT PE showed acute diffuse segmental and subsegmental pulmonary emboli b/l and reflux of contrast into the IVC consistent with R heart strain. Bedside US showed R heart strain. Given Lovenox prior to being started on a heparin drip. Official echo showed moderate RV dilation, hypokinesis of RV free wall with apical motion preservation, right atrial dilation. Patient initially required 3L NC to maintain O2 sat above 90% and heart rate under 120. However, gradually improved over the course of a few days and now able to ambulate on room air with O2 sat dropping maximally to low 90s and HR going up to 120s. Pt being discharged on... Pt is 42yo male with pmhx Neurofibromatosis, HTN and RLE DVT in 2016 who presented with SOB and dizziness, found to have submassive PE. In ED, pt was tachycardic to 123 with D-dimer of 3580, trop 0.17, . CT PE showed acute diffuse segmental and subsegmental pulmonary emboli b/l and reflux of contrast into the IVC consistent with R heart strain. Bedside US showed R heart strain and he was started on a heparin gtt. Official echo showed moderate RV dilation, hypokinesis of RV free wall with apical motion preservation, right atrial dilation. Patient initially required 3L NC but improved. Patient tolerated room air while ambulating. Patient was transitioned to [facility], remained stable, and was discharged home in no acute distress.

## 2019-07-02 VITALS — TEMPERATURE: 98 F

## 2019-07-02 LAB
APCR PPP: 2.92 RATIO — SIGNIFICANT CHANGE UP
CONFIRM APTT STACLOT: POSITIVE
DRVVT SCREEN TO CONFIRM RATIO: SIGNIFICANT CHANGE UP
LA NT DPL PPP QL: 32.6 SEC — SIGNIFICANT CHANGE UP

## 2019-07-03 LAB
CULTURE RESULTS: SIGNIFICANT CHANGE UP
CULTURE RESULTS: SIGNIFICANT CHANGE UP
DNA PLOIDY SPEC FC-IMP: SIGNIFICANT CHANGE UP
MTHFR GENE INTERPRETATION: SIGNIFICANT CHANGE UP
PROT S FREE PPP-ACNC: 35 % NORMAL — LOW (ref 70–150)
PTR INTERPRETATION: SIGNIFICANT CHANGE UP
SPECIMEN SOURCE: SIGNIFICANT CHANGE UP
SPECIMEN SOURCE: SIGNIFICANT CHANGE UP

## 2019-07-05 DIAGNOSIS — J96.01 ACUTE RESPIRATORY FAILURE WITH HYPOXIA: ICD-10-CM

## 2019-07-05 DIAGNOSIS — I26.99 OTHER PULMONARY EMBOLISM WITHOUT ACUTE COR PULMONALE: ICD-10-CM

## 2019-07-05 DIAGNOSIS — I83.891 VARICOSE VEINS OF RIGHT LOWER EXTREMITY WITH OTHER COMPLICATIONS: ICD-10-CM

## 2019-07-05 DIAGNOSIS — I50.811 ACUTE RIGHT HEART FAILURE: ICD-10-CM

## 2019-07-05 DIAGNOSIS — I82.401 ACUTE EMBOLISM AND THROMBOSIS OF UNSPECIFIED DEEP VEINS OF RIGHT LOWER EXTREMITY: ICD-10-CM

## 2019-07-05 DIAGNOSIS — Q85.00 NEUROFIBROMATOSIS, UNSPECIFIED: ICD-10-CM

## 2019-07-05 DIAGNOSIS — D72.829 ELEVATED WHITE BLOOD CELL COUNT, UNSPECIFIED: ICD-10-CM

## 2019-07-05 DIAGNOSIS — Z86.718 PERSONAL HISTORY OF OTHER VENOUS THROMBOSIS AND EMBOLISM: ICD-10-CM

## 2019-07-05 DIAGNOSIS — Z98.890 OTHER SPECIFIED POSTPROCEDURAL STATES: ICD-10-CM

## 2019-07-05 DIAGNOSIS — Z84.81 FAMILY HISTORY OF CARRIER OF GENETIC DISEASE: ICD-10-CM

## 2019-07-05 DIAGNOSIS — I11.0 HYPERTENSIVE HEART DISEASE WITH HEART FAILURE: ICD-10-CM

## 2019-07-05 DIAGNOSIS — Z53.29 PROCEDURE AND TREATMENT NOT CARRIED OUT BECAUSE OF PATIENT'S DECISION FOR OTHER REASONS: ICD-10-CM

## 2019-07-08 PROBLEM — Q85.00 NEUROFIBROMATOSIS, UNSPECIFIED: Chronic | Status: ACTIVE | Noted: 2019-06-28

## 2019-07-09 PROBLEM — Z00.00 ENCOUNTER FOR PREVENTIVE HEALTH EXAMINATION: Status: ACTIVE | Noted: 2019-07-09

## 2019-07-16 ENCOUNTER — APPOINTMENT (OUTPATIENT)
Dept: PULMONOLOGY | Facility: CLINIC | Age: 44
End: 2019-07-16
Payer: COMMERCIAL

## 2019-07-16 VITALS
OXYGEN SATURATION: 98 % | WEIGHT: 172.25 LBS | HEART RATE: 103 BPM | DIASTOLIC BLOOD PRESSURE: 80 MMHG | TEMPERATURE: 98 F | HEIGHT: 71 IN | BODY MASS INDEX: 24.11 KG/M2 | SYSTOLIC BLOOD PRESSURE: 130 MMHG

## 2019-07-16 DIAGNOSIS — I10 ESSENTIAL (PRIMARY) HYPERTENSION: ICD-10-CM

## 2019-07-16 DIAGNOSIS — Q85.00 NEUROFIBROMATOSIS, UNSPECIFIED: ICD-10-CM

## 2019-07-16 DIAGNOSIS — Z78.9 OTHER SPECIFIED HEALTH STATUS: ICD-10-CM

## 2019-07-16 DIAGNOSIS — Z86.718 PERSONAL HISTORY OF OTHER VENOUS THROMBOSIS AND EMBOLISM: ICD-10-CM

## 2019-07-16 PROCEDURE — 94618 PULMONARY STRESS TESTING: CPT

## 2019-07-16 PROCEDURE — 99214 OFFICE O/P EST MOD 30 MIN: CPT | Mod: 25

## 2019-07-16 NOTE — PROCEDURE
[FreeTextEntry1] : EXAM: CT ANGIO CHEST PE PROTOCOL IC \par \par PROCEDURE DATE: 06/28/2019 \par \par \par \par INTERPRETATION: CTA (CT angiography) of the CHEST dated 6/28/2019 10:36 AM \par \par INDICATION: shortness of breath, elevated d-dimer Assess for pulmonary \par embolism. \par \par TECHNIQUE: CT angiography of the chest was performed during bolus injection \par of intravenous contrast. Post-processing including the production of axial, \par coronal and sagittal multiplanar reformatted images and axial and coronal \par maximum intensity projections (MIPs) was performed. \par \par PRIOR STUDY: None. \par \par FINDINGS: \par There are extensive segmental and subsegmental bilateral pulmonary emboli \par seen in the right (upper, middle, and lower) and left (upper and lower) \par lobes. \par \par RV: LV ration is normal. Reflux of contrast into IVC indicative of acute \par right heart failure/ right heart strain. \par \par Mild aneurysmal dilation of the aortic root measuring 4.1 cm. Mild dilation \par of the main pulmonary artery measuring 3.3 cm \par \par The heart is normal in size. No pericardial effusion is seen. No \par mediastinal, hilar or axillary lymphadenopathy is seen. \par \par No pleural effusions are seen. There two small wedge shaped areas of wedge \par shaped consolidation in the peripheral portion of the left left lower lobe \par consistent with acute pulmonary emboli. \par \par Limited evaluation of the upper abdomen demonstrates reflux of contrast into \par the IVC consistent with heart failure. \par \par Evaluation of the osseous structures demonstrates mild degenerative changes. \par \par \par IMPRESSION: \par \par Acute diffuse segmental and subsegmental pulmonary bilaterally. \par \par Reflux of contrast into the IVC consistent with right heart strain. \par \par Findings were discussed with Dr. Bunn on 6/28/19 at 10:46 am. \par \par \par \par \par \par Thank you for the opportunity to participate in the care of this patient. \par \par KATI GOYAL M.D., RADIOLOGY RESIDENT \par This document has been electronically signed. \par EDY MORENO M.D., ATTENDING RADIOLOGIST \par This document has been electronically signed. Jun 28 2019 11:28AM \par \par 6 min walk \par \par Pre\par Sandrita scale 0\par O2 saturation 97%\par \par \par Post \par Sandrita scale 0\par O2 saturation 96%\par  - 114\par \par Pt finished 6 min walk and walked a total 432 meters.\par

## 2019-07-16 NOTE — PHYSICAL EXAM
[General Appearance - Well Developed] : well developed [Normal Appearance] : normal appearance [Well Groomed] : well groomed [General Appearance - Well Nourished] : well nourished [No Deformities] : no deformities [General Appearance - In No Acute Distress] : no acute distress [Normal Conjunctiva] : the conjunctiva exhibited no abnormalities [Eyelids - No Xanthelasma] : the eyelids demonstrated no xanthelasmas [Normal Oropharynx] : normal oropharynx [Neck Appearance] : the appearance of the neck was normal [Neck Cervical Mass (___cm)] : no neck mass was observed [Jugular Venous Distention Increased] : there was no jugular-venous distention [Thyroid Diffuse Enlargement] : the thyroid was not enlarged [Thyroid Nodule] : there were no palpable thyroid nodules [Heart Rate And Rhythm] : heart rate and rhythm were normal [Heart Sounds] : normal S1 and S2 [Murmurs] : no murmurs present [Respiration, Rhythm And Depth] : normal respiratory rhythm and effort [Exaggerated Use Of Accessory Muscles For Inspiration] : no accessory muscle use [Auscultation Breath Sounds / Voice Sounds] : lungs were clear to auscultation bilaterally [Abnormal Walk] : normal gait [Gait - Sufficient For Exercise Testing] : the gait was sufficient for exercise testing [Nail Clubbing] : no clubbing of the fingernails [Cyanosis, Localized] : no localized cyanosis [Petechial Hemorrhages (___cm)] : no petechial hemorrhages [] : no ischemic changes [Deep Tendon Reflexes (DTR)] : deep tendon reflexes were 2+ and symmetric [Sensation] : the sensory exam was normal to light touch and pinprick [No Focal Deficits] : no focal deficits

## 2019-07-16 NOTE — REASON FOR VISIT
[Follow-Up - From Hospitalization] : a hospitalization follow-up [Pulmonary Embolism] : pulmonary embolism

## 2019-07-16 NOTE — ASSESSMENT
[FreeTextEntry1] : Patient was found to have a unprovoked PE on 6/28/2019. Patient developed submassive pulmonary emboli without cor pulmonale.  The patient was/was not a candidate for intervention with intra-arterial tPA.  The patient continues to improve on anticoagulation. There is no clinical evidence neither of bleeding nor failure of therapy.  Pt instructed to avoid aspirin and NSAIDS while on anticoagulation and to inform me of any bleeding.  The patient is to continue on the current regimen for a minimum of six months. The patient will be evaluated with repeat CAT scan of the chest/VQ scan and echocardiogram and decision whether to discontinue therapy or extend beyond standard depending on patient condition.  The concern is chronic thromboembolic pulmonary hypertension.\par  - 6 min walk performed today showed negative for desaturation.\par  - Referral to hematology for thrombophilia workup. \par - Pt instructed to follow on CA screenings.\par - follow with CT scan 3 month to evaluate pulmonary infract\par - Echo showed PSAP 30 mm hg Follow with repeat stress echo in 3 months\par - VQ scan in 6 months\par - Follow up appt in October

## 2019-07-16 NOTE — HISTORY OF PRESENT ILLNESS
[Difficulty Breathing During Exertion] : denies dyspnea on exertion [Feelings Of Weakness On Exertion] : denies exercise intolerance [Cough] : denies coughing [Wheezing] : denies wheezing [Regional Soft Tissue Swelling Both Lower Extremities] : denies lower extremity edema [Chest Pain Or Discomfort] : denies chest pain [Fever] : denies fever [0  -  Nothing at all] : 0, nothing at all [Never] : was never a smoker [Oxygen] : the patient uses no supplemental oxygen [More Frequent Use Needed Recently] : Patient reports no recent increase in frequency of [FreeTextEntry1] : Pt is 42yo male with pmhx Neurofibromatosis, HTN and RLE DVT in 2016 who  presented to [facility] on 6/28/2019 with SOB and dizziness, found to have unprovoked submassive PE. In ED, pt was  tachycardic to 123 with D-dimer of 3580, trop 0.17, . CT PE showed acute  diffuse segmental and subsegmental pulmonary emboli b/l and reflux of contrast  into the IVC consistent with R heart strain. Bedside US showed R heart strain  and he was started on a heparin gtt. Official echo showed moderate RV dilation,  hypokinesis of RV free wall with apical motion preservation, right atrial  dilation.\par \par Pt was dischargeED from [facility] 7/1/2019 on Eliquis BID.  Pt denies SOB with significant improvement able to climb subway stairs and walk without SOB.  He has had experienced cramping in the right leg and increased swelling of the right 2 days ago after discharge.  The swelling has improved.  He denies any bleeding with the Eliquis. 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 \par \par \par

## 2019-10-09 ENCOUNTER — FORM ENCOUNTER (OUTPATIENT)
Age: 44
End: 2019-10-09

## 2019-10-10 ENCOUNTER — APPOINTMENT (OUTPATIENT)
Dept: CT IMAGING | Facility: HOSPITAL | Age: 44
End: 2019-10-10
Payer: COMMERCIAL

## 2019-10-10 ENCOUNTER — OUTPATIENT (OUTPATIENT)
Dept: OUTPATIENT SERVICES | Facility: HOSPITAL | Age: 44
LOS: 1 days | End: 2019-10-10
Payer: COMMERCIAL

## 2019-10-10 DIAGNOSIS — I26.99 OTHER PULMONARY EMBOLISM WITHOUT ACUTE COR PULMONALE: ICD-10-CM

## 2019-10-10 PROCEDURE — 71250 CT THORAX DX C-: CPT

## 2019-10-10 PROCEDURE — 93351 STRESS TTE COMPLETE: CPT

## 2019-10-10 PROCEDURE — 71250 CT THORAX DX C-: CPT | Mod: 26

## 2019-10-10 PROCEDURE — 93351 STRESS TTE COMPLETE: CPT | Mod: 26

## 2019-10-16 ENCOUNTER — APPOINTMENT (OUTPATIENT)
Dept: PULMONOLOGY | Facility: CLINIC | Age: 44
End: 2019-10-16

## 2020-01-27 ENCOUNTER — FORM ENCOUNTER (OUTPATIENT)
Age: 45
End: 2020-01-27

## 2020-01-28 ENCOUNTER — APPOINTMENT (OUTPATIENT)
Dept: CT IMAGING | Facility: HOSPITAL | Age: 45
End: 2020-01-28

## 2020-01-28 ENCOUNTER — OUTPATIENT (OUTPATIENT)
Dept: OUTPATIENT SERVICES | Facility: HOSPITAL | Age: 45
LOS: 1 days | End: 2020-01-28
Payer: COMMERCIAL

## 2020-01-28 PROCEDURE — 71260 CT THORAX DX C+: CPT | Mod: 26

## 2020-01-28 PROCEDURE — 71260 CT THORAX DX C+: CPT

## 2020-01-30 ENCOUNTER — APPOINTMENT (OUTPATIENT)
Dept: PULMONOLOGY | Facility: CLINIC | Age: 45
End: 2020-01-30
Payer: COMMERCIAL

## 2020-01-30 VITALS
DIASTOLIC BLOOD PRESSURE: 70 MMHG | WEIGHT: 175 LBS | BODY MASS INDEX: 24.5 KG/M2 | HEART RATE: 93 BPM | TEMPERATURE: 98 F | HEIGHT: 71 IN | OXYGEN SATURATION: 99 % | SYSTOLIC BLOOD PRESSURE: 120 MMHG

## 2020-01-30 PROCEDURE — 99214 OFFICE O/P EST MOD 30 MIN: CPT

## 2020-01-30 NOTE — PHYSICAL EXAM
[Normal Oropharynx] : normal oropharynx [No Acute Distress] : no acute distress [No Neck Mass] : no neck mass [Normal Appearance] : normal appearance [Normal Rate/Rhythm] : normal rate/rhythm [Normal S1, S2] : normal s1, s2 [No Resp Distress] : no resp distress [No Murmurs] : no murmurs [No Abnormalities] : no abnormalities [Clear to Auscultation Bilaterally] : clear to auscultation bilaterally [Benign] : benign [No Clubbing] : no clubbing [Normal Gait] : normal gait [No Cyanosis] : no cyanosis [No Edema] : no edema [FROM] : FROM [No Focal Deficits] : no focal deficits [Normal Color/ Pigmentation] : normal color/ pigmentation [Oriented x3] : oriented x3 [Normal Affect] : normal affect

## 2020-01-30 NOTE — END OF VISIT
[Time Spent: ___ minutes] : I have spent [unfilled] minutes of face to face time with the patient [] : Fellow [>50% of Time Spent on Counseling and Coordination of Care for  ___] : Greater than 50% of the encounter time was spent on counseling and coordination of care for [unfilled]

## 2020-01-30 NOTE — HISTORY OF PRESENT ILLNESS
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 Patient feels well, denies any SOB, chest pain or chest discomfort. Denies any bleeding episodes or unusual bruising. Has had repeat CT chest that showed resolution of clot burden but new opacity in the RLL that increased from october to January. \par Was seen by dr celaya for hematology evaluation. Was told all tests are negative but he needs to be tested again off of AC. \par

## 2020-01-30 NOTE — ASSESSMENT
[FreeTextEntry1] : Patient was found to have a unprovoked PE on 6/28/2019. Patient developed submassive pulmonary emboli without cor pulmonale. The patient was/was not a candidate for intervention with intra-arterial tPA.  \par \par Since then had stress test without abnormality of elevated PA pressures. \par Repeat CT chest in october showed improvement of L sided opacities , thought to be infarctions, but development of R sided ones. \par Repeat CT in january 2020 showed resolution of clot and of L sided opacities, but possible change in R sided opacities. \par \par Decision to extend anticoagulation remains difficult. Patient with 1 unprovoked episode and a second likely provoked episode, however there has been association with neurofibromatosis type 1 and PE, thus making him high risk. \par \par - Continue full dose AC with eliquis for now. Will likely continue long term prophylactic AC  \par - Pt instructed to follow on CA screenings.\par - Will discuss CT findings with Radiology Re : new r sided findings. \par - F/u V/Q Scan results. \par \par RTC after repeat CT.

## 2020-04-05 ENCOUNTER — TRANSCRIPTION ENCOUNTER (OUTPATIENT)
Age: 45
End: 2020-04-05

## 2020-12-23 ENCOUNTER — APPOINTMENT (OUTPATIENT)
Dept: CT IMAGING | Facility: HOSPITAL | Age: 45
End: 2020-12-23

## 2020-12-23 ENCOUNTER — OUTPATIENT (OUTPATIENT)
Dept: OUTPATIENT SERVICES | Facility: HOSPITAL | Age: 45
LOS: 1 days | End: 2020-12-23
Payer: COMMERCIAL

## 2020-12-23 PROCEDURE — 71250 CT THORAX DX C-: CPT

## 2020-12-23 PROCEDURE — 71250 CT THORAX DX C-: CPT | Mod: 26

## 2021-01-12 ENCOUNTER — APPOINTMENT (OUTPATIENT)
Dept: PULMONOLOGY | Facility: CLINIC | Age: 46
End: 2021-01-12
Payer: COMMERCIAL

## 2021-01-12 DIAGNOSIS — R91.8 OTHER NONSPECIFIC ABNORMAL FINDING OF LUNG FIELD: ICD-10-CM

## 2021-01-12 PROCEDURE — 99214 OFFICE O/P EST MOD 30 MIN: CPT | Mod: 95

## 2021-01-12 NOTE — HISTORY OF PRESENT ILLNESS
[Never] : never [TextBox_4] : Doing very well.  He is following up on his CT scan.  He has no bleeding from the nose the gums.  He has no problem with exercise capacity

## 2021-01-12 NOTE — ASSESSMENT
[FreeTextEntry1] : I discussed the case in details with the patient.  I reviewed the images via telemedicine with the patient I reviewed the CT scan from June 2019, October 2019, January 2020, August 2020 and December 2020.  There is was resolution of the pulmonary infarct on the left lung.  The right lower lobe nodule which is seems to be more to fibrotic change and related to the previous infarct.  There is decrease in the size from 1.5 x 1.4 to 1.4 x 1.3.  The finding is really not suggestive of underlying malignancy.  At this point I would repeat the CT scan of the chest in 1 year.\par \par The patient is on anticoagulation for more than 1 year.  Patient was advised to follow-up with hematology for thrombophilia work-up.  I discussed with the patient that neurofibromatosis might be associated with thromboembolic disease.  I will discussed the case with the hematologist and further recommendation will follow

## 2021-01-12 NOTE — REASON FOR VISIT
[Home] : at home, [unfilled] , at the time of the visit. [Medical Office: ([facility])___] : at the medical office located in  [Follow-Up] : a follow-up visit [Abnormal CXR/ Chest CT] : an abnormal CXR/ chest CT

## 2021-09-09 ENCOUNTER — NON-APPOINTMENT (OUTPATIENT)
Age: 46
End: 2021-09-09

## 2021-09-09 ENCOUNTER — APPOINTMENT (OUTPATIENT)
Dept: PULMONOLOGY | Facility: CLINIC | Age: 46
End: 2021-09-09
Payer: COMMERCIAL

## 2021-09-09 DIAGNOSIS — I26.99 OTHER PULMONARY EMBOLISM W/OUT ACUTE COR PULMONALE: ICD-10-CM

## 2021-09-09 DIAGNOSIS — D68.59 OTHER PRIMARY THROMBOPHILIA: ICD-10-CM

## 2021-09-09 PROCEDURE — 99214 OFFICE O/P EST MOD 30 MIN: CPT | Mod: 95

## 2021-09-09 RX ORDER — APIXABAN 2.5 MG/1
2.5 TABLET, FILM COATED ORAL
Refills: 0 | Status: ACTIVE | COMMUNITY
Start: 2019-07-16

## 2021-09-09 NOTE — HISTORY OF PRESENT ILLNESS
[Never] : never [TextBox_4] : He is doing well and was seen by the hematologist and was decreased to 2.5 mg twice a day.  No sob.  No bleeding.  No swelling of the leg.  He is concerned about the covid

## 2021-09-09 NOTE — ASSESSMENT
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  There is no clinical evidence neither recurrence of failure of the anticoagulation.  The patient will be on anticoagulation indefinitely.  I.\par \par I had a long discussion with the patient about the safety and efficacy of the Covid vaccination.  The reports of thromboembolic disease complicating the Covid vaccines although the incidence is very rare.\par \par Patient is hypercoagulable with previous history of submassive pulmonary emboli with complicated by an pulmonary infarct.  The patient is concerned about recurrence of thromboembolic disease as a role result of the Covid vaccines.  I explained to the patient that the data is regarding the incident thromboembolic disease is low but there is no results from clinical trials to improve the safety of the Covid vaccine regarding thromboembolic disease.  Patient is concerned as she has concerns is justified regarding holding the Covid vaccines until further data is available from the clinical trials about the safety of Covid vaccine in regard to thromboembolic disease.\par \par I also indicated the patient that the safety and efficacy of the Covid vaccine in either preventing or minimizing the severity of the Covid infection

## 2021-09-09 NOTE — REASON FOR VISIT
[Home] : at home, [unfilled] , at the time of the visit. [Spouse] : spouse [Follow-Up] : a follow-up visit

## 2022-04-17 NOTE — ED ADULT NURSE NOTE - RESPIRATORY ASSESSMENT
Problem: At Risk for Falls  Goal: # Patient does not fall  Outcome: Outcome Met, Continue evaluating goal progress toward completion     Problem: At Risk for Falls  Goal: # Takes action to control fall-related risks  Outcome: Outcome Not Met, Plan Adjusted  Goal: # Verbalizes understanding of fall risk/precautions  Description: Document education using the patient education activity  Outcome: Outcome Not Met, Plan Adjusted      - - -

## 2022-12-29 NOTE — PATIENT PROFILE ADULT - DO YOU WANT TO COMPLETE THE HCP AND NAME A HEALTH CARE AGENT
no Detail Level: Zone Plan: Patient will start the following treatment:\\nAm\\nWash face with benzoyl peroxide 5%\\nApply a thin layer of clindamycin \\nMoisturize with SPF \\n\\n\\nPm\\nWash face with gentle cleanser \\nMoisturize\\n\\nPatient has improved with current regimen but will stop Spironolactone and Tretinoin due to trying for pregnancy. yes

## 2023-05-06 NOTE — ED PROVIDER NOTE - SKIN NEGATIVE STATEMENT, MLM
Called to give report  Facility requested to call back to the next shift URI Bedolla RN  05/06/23 [phone] no abrasions, no jaundice, no lesions, no pruritis, and no rashes.

## 2024-02-19 NOTE — PROGRESS NOTE ADULT - PROBLEM SELECTOR PLAN 3
Patient is calling because she is scheduled to be seen today with Dr. Bradford is requesting to have her appointment canceled.    WBC elevated to 12.11 with neutrophil predominance. Most likely reactive leukocytosis given PE and no other indications of infection (afebrile, no clinical symptoms)  -continue to monitor CBC WBC elevated to 12.11 on admission with neutrophil predominance. Most likely reactive leukocytosis given PE and no other indications of infection (afebrile, no clinical symptoms)  -WBC downtrended to 8.87  -continue to monitor CBC WBC elevated to 12.11 on admission with neutrophil predominance. Most likely reactive leukocytosis given PE and no other indications of infection (afebrile, no clinical symptoms)  -WBC downtrended from to 12.11 to 8.87  -continue to monitor CBC
